# Patient Record
Sex: FEMALE | Race: BLACK OR AFRICAN AMERICAN | Employment: UNEMPLOYED | URBAN - METROPOLITAN AREA
[De-identification: names, ages, dates, MRNs, and addresses within clinical notes are randomized per-mention and may not be internally consistent; named-entity substitution may affect disease eponyms.]

---

## 2018-03-14 ENCOUNTER — OFFICE VISIT (OUTPATIENT)
Dept: NEUROLOGY | Age: 28
End: 2018-03-14

## 2018-03-14 VITALS
DIASTOLIC BLOOD PRESSURE: 80 MMHG | SYSTOLIC BLOOD PRESSURE: 118 MMHG | HEIGHT: 60 IN | OXYGEN SATURATION: 99 % | BODY MASS INDEX: 26.5 KG/M2 | HEART RATE: 80 BPM | WEIGHT: 135 LBS

## 2018-03-14 DIAGNOSIS — R56.9 SEIZURE (HCC): Primary | ICD-10-CM

## 2018-03-14 DIAGNOSIS — G80.9 CEREBRAL PALSY, UNSPECIFIED TYPE (HCC): ICD-10-CM

## 2018-03-14 RX ORDER — RISPERIDONE 3 MG/1
3 TABLET, FILM COATED ORAL 4 TIMES DAILY
COMMUNITY

## 2018-03-14 RX ORDER — DIVALPROEX SODIUM 500 MG/1
500 TABLET, DELAYED RELEASE ORAL 2 TIMES DAILY
Qty: 60 TAB | Refills: 2 | Status: SHIPPED | OUTPATIENT
Start: 2018-03-14 | End: 2018-03-14 | Stop reason: DRUGHIGH

## 2018-03-14 RX ORDER — VALPROIC ACID (AS SODIUM SALT) 100 %
500 POWDER (GRAM) MISCELLANEOUS 2 TIMES DAILY
Qty: 100 G | Refills: 2 | Status: SHIPPED | OUTPATIENT
Start: 2018-03-14 | End: 2018-03-15

## 2018-03-14 NOTE — PROGRESS NOTES
Neurology Note  REFERRED BY:  No primary care provider on file.    03/14/18    Chief Complaint   Patient presents with    New Patient     Seizure 3/12/2018       HISTORY OF PRESENT ILLNESS  Mariam Elmore is a 24 y.o. female who presented to the neurology office for seizures. The patient does have cerebral palsy and at baseline is nonverbal.  She can walk but does have problems in eating and her food has to be broken down into smaller pieces. The patient is also incontinent for bowel and bladder. The patient started having seizures at the age of 5 and has been tried on multiple medications. The patient has tried Dilantin, Topamax, phenobarbital, Lamictal, Tegretol and last was taking Depakote and was doing pretty well. Depakote was discontinued around 2010 as the patient had not had seizures for a while. The patient was brought back again because in early March 2018, the mom went to the room and saw that she was clinching and tensing up her upper extremities. She did not lose her consciousness but had gaze deviation to one side. It lasted for around 1 minute. After that she was tired and went back to sleep. The patient did have an EEG and imaging of the brain in the past but they are not available to me. Current Outpatient Prescriptions   Medication Sig    risperiDONE (RISPERDAL) 3 mg tablet Take  by mouth.  divalproex DR (DEPAKOTE) 500 mg tablet Take 1 Tab by mouth two (2) times a day. No current facility-administered medications for this visit. Not on File  History reviewed. No pertinent past medical history. History reviewed. No pertinent surgical history. History reviewed. No pertinent family history.   Social History   Substance Use Topics    Smoking status: Never Smoker    Smokeless tobacco: Never Used    Alcohol use None       REVIEW OF SYSTEMS:   A ten system review of constitutional, cardiovascular, respiratory, musculoskeletal, endocrine, skin, SHEENT, genitourinary, psychiatric and neurologic systems was obtained and is unremarkable with the exception of seizures     EXAMINATION:   Visit Vitals    /80    Pulse 80    Ht 5' (1.524 m)    Wt 135 lb (61.2 kg)    SpO2 99%    BMI 26.37 kg/m2        General:   General appearance: Pt is in no acute distress   Distal pulses are preserved  Fundoscopic Exam: Attempted    Neurological Examination:Limited as the patient is nonverbal and does not understand commands. Patient has cerebral palsy  Mental Status: Patient is alert and awake but does not follow commands     Cranial Nerves: Pupils are equal and reactive to light. Extraocular movements are intact and face is symmetric. Motor: Strength is 5/5 in all 4 ext. No atrophy. Tone: Normal    Sensation: Unable to assess    Reflexes: DTRs 2+ throughout. Coordination/Cerebellar: Unable to assess    Gait: Patient is in a wheelchair    Skin: No significant bruising or lacerations. Laboratory review:   No results found for this or any previous visit. Imaging review:  None    Documentation review:  None    Assessment/Plan:   Mu Marcus is a 24 y.o. female who presented to the neurology office for management of seizures. Patient does have cerebral palsy and has history of seizures but was discontinued on Depakote around 8-9 years ago. The patient has been having intermittent staring spells and did have a seizure 3 days ago. We will restart the patient on Depakote 500 mg p.o. twice daily and will get records from her previous neurologist.    Follow-up in 6 weeks and at that time will get Depakote level.     Over 60 minutes was spent with the patient and family of which > 50% of the visit was spent counseling on diagnosis, management, and treatment of the diagnosis        PHQ over the last two weeks 3/14/2018   Little interest or pleasure in doing things Not at all   Feeling down, depressed or hopeless Not at all   Total Score PHQ 2 0     Primary care to address possible depression if PHQ-9 score is more than 9. ICD-10-CM ICD-9-CM    1. Seizure ([facility] R56.9 780.39 divalproex DR (DEPAKOTE) 500 mg tablet   2. Cerebral palsy, unspecified type ([facility] G80.9 343.9       Thank you for allowing me to participate in the care of Ms. [facility]. Please feel free to contact me if you have any questions. Lizbeth Jimenez MD  Neurologist    CC: No primary care provider on file. Fax: None    This note was created using voice recognition software. Despite editing, there may be syntax errors.

## 2018-03-14 NOTE — MR AVS SNAPSHOT
[address], 
[address], [address] [place] [phone] Patient: Stacey Sevilla MRN: [id number] :1990 Visit Information Date & Time Provider Department Dept. Phone Encounter #  
 3/14/2018  3:00 PM Lily Egan MD Neurology [facility] at [facility] [phone] [id number] Upcoming Health Maintenance Date Due  
 HPV AGE 9Y-34Y (1 of 3 - Female 3 Dose Series) 2003 DTaP/Tdap/Td series (1 - Tdap) 2013 PAP AKA CERVICAL CYTOLOGY 2013 Influenza Age 5 to Adult 2017 Allergies as of 3/14/2018  Review Complete On: 3/14/2018 By: Lily Egan MD  
 Not on File Current Immunizations  Never Reviewed No immunizations on file. Not reviewed this visit You Were Diagnosed With   
  
 Codes Comments Seizure ([facility]    -  Primary ICD-10-CM: R56.9 ICD-9-CM: 780.39 Cerebral palsy, unspecified type ([facility]     ICD-10-CM: G80.9 ICD-9-CM: 689. 9 Vitals BP Pulse Height(growth percentile) Weight(growth percentile) SpO2 BMI  
 118/80 80 5' (1.524 m) 135 lb (61.2 kg) 99% 26.37 kg/m2 Smoking Status Never Smoker Vitals History BMI and BSA Data Body Mass Index Body Surface Area  
 26.37 kg/m 2 1.61 m 2 Preferred Pharmacy Pharmacy Name Phone [id number]  [facility] [address] [place] [address] [phone] Your Updated Medication List  
  
   
This list is accurate as of 3/14/18  3:33 PM.  Always use your most recent med list.  
  
  
  
  
 divalproex  mg tablet Commonly known as:  DEPAKOTE Take 1 Tab by mouth two (2) times a day. risperiDONE 3 mg tablet Commonly known as:  RisperDAL Take  by mouth. Prescriptions Sent to Pharmacy Refills  
 divalproex DR (DEPAKOTE) 500 mg tablet 2 Sig: Take 1 Tab by mouth two (2) times a day.   
 Class: Normal  
 Pharmacy: [facility] [address] [place] [address], [address] [facility] #: [phone] Route: Oral  
  
Patient Instructions 1. Start Depakote 500 mg p.o. twice daily 2. Follow-up in 6 weeks 3. Get records from the previous neurologist 
  
A Healthy Lifestyle: Care Instructions Your Care Instructions A healthy lifestyle can help you feel good, stay at a healthy weight, and have plenty of energy for both work and play. A healthy lifestyle is something you can share with your whole family. A healthy lifestyle also can lower your risk for serious health problems, such as high blood pressure, heart disease, and diabetes. You can follow a few steps listed below to improve your health and the health of your family. Follow-up care is a key part of your treatment and safety. Be sure to make and go to all appointments, and call your doctor if you are having problems. It's also a good idea to know your test results and keep a list of the medicines you take. How can you care for yourself at home? · Do not eat too much sugar, fat, or fast foods. You can still have dessert and treats now and then. The goal is moderation. · Start small to improve your eating habits. Pay attention to portion sizes, drink less juice and soda pop, and eat more fruits and vegetables. ¨ Eat a healthy amount of food. A 3-ounce serving of meat, for example, is about the size of a deck of cards. Fill the rest of your plate with vegetables and whole grains. ¨ Limit the amount of soda and sports drinks you have every day. Drink more water when you are thirsty. ¨ Eat at least 5 servings of fruits and vegetables every day. It may seem like a lot, but it is not hard to reach this goal. A serving or helping is 1 piece of fruit, 1 cup of vegetables, or 2 cups of leafy, raw vegetables. Have an apple or some carrot sticks as an afternoon snack instead of a candy bar.  Try to have fruits and/or vegetables at every meal. 
 · Make exercise part of your daily routine. You may want to start with simple activities, such as walking, bicycling, or slow swimming. Try to be active 30 to 60 minutes every day. You do not need to do all 30 to 60 minutes all at once. For example, you can exercise 3 times a day for 10 or 20 minutes. Moderate exercise is safe for most people, but it is always a good idea to talk to your doctor before starting an exercise program. 
· Keep moving. [place] the lawn, work in the garden, or [employer]. Take the stairs instead of the elevator at work. · If you smoke, quit. People who smoke have an increased risk for heart attack, stroke, cancer, and other lung illnesses. Quitting is hard, but there are ways to boost your chance of quitting tobacco for good. ¨ Use nicotine gum, patches, or lozenges. ¨ Ask your doctor about stop-smoking programs and medicines. ¨ Keep trying. In addition to reducing your risk of diseases in the future, you will notice some benefits soon after you stop using tobacco. If you have shortness of breath or asthma symptoms, they will likely get better within a few weeks after you quit. · Limit how much alcohol you drink. Moderate amounts of alcohol (up to 2 drinks a day for men, 1 drink a day for women) are okay. But drinking too much can lead to liver problems, high blood pressure, and other health problems. Family health If you have a family, there are many things you can do together to improve your health. · Eat meals together as a family as often as possible. · Eat healthy foods. This includes fruits, vegetables, lean meats and dairy, and whole grains. · Include your family in your fitness plan. Most people think of activities such as jogging or tennis as the way to fitness, but there are many ways you and your family can be more active. Anything that makes you breathe hard and gets your heart pumping is exercise. Here are some tips: ¨ Walk to do errands or to take your child to school or the bus. ¨ Go for a family bike ride after dinner instead of watching TV. Where can you learn more? Go to http://adithya-florina.info/. Enter V646 in the search box to learn more about \"A Healthy Lifestyle: Care Instructions. \" Current as of: May 12, 2017 Content Version: 11.4 © 4216-2752 [employer]. Care instructions adapted under license by [employer] (which disclaims liability or warranty for this information). If you have questions about a medical condition or this instruction, always ask your healthcare professional. [address] any warranty or liability for your use of this information. Please be advised there is a $25 fee for all paperwork to be completed from our  providers. This is to be paid by the patient prior to picking up the completed forms. Introducing [facility]! Florence Bergeron introduces Comenta.[employer] (Wayin) patient portal. Now you can access parts of your medical record, email your doctor's office, and request medication refills online. 1. In your internet browser, go to [email or web address] 2. Click on the First Time User? Click Here link in the Sign In box. You will see the New Member Sign Up page. 3. Enter your [employer] (Wayin) Access Code exactly as it appears below. You will not need to use this code after youve completed the sign-up process. If you do not sign up before the expiration date, you must request a new code. · [employer] (Wayin) Access Code: [email or web address] Expires: 6/12/2018  2:53 PM 
 
4. Enter the last four digits of your Social Security Number (xxxx) and Date of Birth (mm/dd/yyyy) as indicated and click Submit. You will be taken to the next sign-up page. 5. Create a Comenta.TV (Wayin) ID. This will be your Com[employer]TV (Wayin) login ID and cannot be changed, so think of one that is secure and easy to remember. 6. Create a medidametrics password. You can change your password at any time. 7. Enter your Password Reset Question and Answer. This can be used at a later time if you forget your password. 8. Enter your e-mail address. You will receive e-mail notification when new information is available in [address] 9. Click Sign Up. You can now view and download portions of your medical record. 10. Click the Download Summary menu link to download a portable copy of your medical information. If you have questions, please visit the Frequently Asked Questions section of the medidametrics website. Remember, medidametrics is NOT to be used for urgent needs. For medical emergencies, dial 911. Now available from your iPhone and Android! Please provide this summary of care documentation to your next provider. If you have any questions after today's visit, please call [phone].

## 2018-03-14 NOTE — PATIENT INSTRUCTIONS
1.  Start Depakote 500 mg p.o. twice daily  2. Follow-up in 6 weeks  3. Get records from the previous neurologist     A Healthy Lifestyle: Care Instructions  Your Care Instructions    A healthy lifestyle can help you feel good, stay at a healthy weight, and have plenty of energy for both work and play. A healthy lifestyle is something you can share with your whole family. A healthy lifestyle also can lower your risk for serious health problems, such as high blood pressure, heart disease, and diabetes. You can follow a few steps listed below to improve your health and the health of your family. Follow-up care is a key part of your treatment and safety. Be sure to make and go to all appointments, and call your doctor if you are having problems. It's also a good idea to know your test results and keep a list of the medicines you take. How can you care for yourself at home? · Do not eat too much sugar, fat, or fast foods. You can still have dessert and treats now and then. The goal is moderation. · Start small to improve your eating habits. Pay attention to portion sizes, drink less juice and soda pop, and eat more fruits and vegetables. ¨ Eat a healthy amount of food. A 3-ounce serving of meat, for example, is about the size of a deck of cards. Fill the rest of your plate with vegetables and whole grains. ¨ Limit the amount of soda and sports drinks you have every day. Drink more water when you are thirsty. ¨ Eat at least 5 servings of fruits and vegetables every day. It may seem like a lot, but it is not hard to reach this goal. A serving or helping is 1 piece of fruit, 1 cup of vegetables, or 2 cups of leafy, raw vegetables. Have an apple or some carrot sticks as an afternoon snack instead of a candy bar. Try to have fruits and/or vegetables at every meal.  · Make exercise part of your daily routine. You may want to start with simple activities, such as walking, bicycling, or slow swimming.  Try to be active 30 to 60 minutes every day. You do not need to do all 30 to 60 minutes all at once. For example, you can exercise 3 times a day for 10 or 20 minutes. Moderate exercise is safe for most people, but it is always a good idea to talk to your doctor before starting an exercise program.  · Keep moving. Jaciel Gomez the lawn, work in the garden, or [employer]. Take the stairs instead of the elevator at work. · If you smoke, quit. People who smoke have an increased risk for heart attack, stroke, cancer, and other lung illnesses. Quitting is hard, but there are ways to boost your chance of quitting tobacco for good. ¨ Use nicotine gum, patches, or lozenges. ¨ Ask your doctor about stop-smoking programs and medicines. ¨ Keep trying. In addition to reducing your risk of diseases in the future, you will notice some benefits soon after you stop using tobacco. If you have shortness of breath or asthma symptoms, they will likely get better within a few weeks after you quit. · Limit how much alcohol you drink. Moderate amounts of alcohol (up to 2 drinks a day for men, 1 drink a day for women) are okay. But drinking too much can lead to liver problems, high blood pressure, and other health problems. Family health  If you have a family, there are many things you can do together to improve your health. · Eat meals together as a family as often as possible. · Eat healthy foods. This includes fruits, vegetables, lean meats and dairy, and whole grains. · Include your family in your fitness plan. Most people think of activities such as jogging or tennis as the way to fitness, but there are many ways you and your family can be more active. Anything that makes you breathe hard and gets your heart pumping is exercise. Here are some tips:  ¨ Walk to do errands or to take your child to school or the bus. ¨ Go for a family bike ride after dinner instead of watching TV. Where can you learn more?   Go to http://adithya-florina.info/. Enter A699 in the search box to learn more about \"A Healthy Lifestyle: Care Instructions. \"  Current as of: May 12, 2017  Content Version: 11.4  © 0877-5286 Healthwise, [employer]. Care instructions adapted under license by [employer] (which disclaims liability or warranty for this information). If you have questions about a medical condition or this instruction, always ask your healthcare professional. [place] [address] any warranty or liability for your use of this information. Please be advised there is a $25 fee for all paperwork to be completed from our  providers. This is to be paid by the patient prior to picking up the completed forms.

## 2018-03-15 RX ORDER — DIVALPROEX SODIUM 125 MG/1
CAPSULE, COATED PELLETS ORAL
Qty: 240 CAP | Refills: 2 | Status: SHIPPED | OUTPATIENT
Start: 2018-03-15 | End: 2020-01-28

## 2018-03-15 NOTE — TELEPHONE ENCOUNTER
Called [facility] verbal order given for Depakote. Dr. Reshma Schulz please review prescription and sign if okay.

## 2018-11-19 ENCOUNTER — OFFICE VISIT (OUTPATIENT)
Dept: NEUROLOGY | Age: 28
End: 2018-11-19

## 2018-11-19 VITALS — SYSTOLIC BLOOD PRESSURE: 110 MMHG | HEIGHT: 60 IN | DIASTOLIC BLOOD PRESSURE: 62 MMHG | BODY MASS INDEX: 26.37 KG/M2

## 2018-11-19 DIAGNOSIS — G80.9 CEREBRAL PALSY, UNSPECIFIED TYPE (HCC): ICD-10-CM

## 2018-11-19 DIAGNOSIS — R56.9 SEIZURE (HCC): Primary | ICD-10-CM

## 2018-11-19 NOTE — PROGRESS NOTES
Neurology Note    Chief Complaint   Patient presents with    Follow-up     seizures       SUBJECTIVE:    Mina Vidal is a 24 y.o. female who presented to the neurology office for seizures. The patient does have cerebral palsy and at baseline is nonverbal.  She can walk but does have problems in eating and her food has to be broken down into smaller pieces. The patient is also incontinent for bowel and bladder. The patient started having seizures at the age of 5 and has been tried on multiple medications. The patient has tried Dilantin, Topamax, phenobarbital, Lamictal, Tegretol and last was taking Depakote and was doing pretty well. Depakote was discontinued around 2010 as the patient had not had seizures for a while. The patient was brought back again because in early March 2018, the mom went to the room and saw that she was clinching and tensing up her upper extremities. She did not lose her consciousness but had gaze deviation to one side. It lasted for around 1 minute. After that she was tired and went back to sleep. The patient did have an EEG and imaging of the brain in the past but they are not available to me. Interval history:  The patient is doing Depakote 500 mg p.o. twice daily and has not had any more staring spells. Current Outpatient Medications   Medication Sig    divalproex (DEPAKOTE SPRINKLES) 125 mg capsule Take four capsules 2 times a day    risperiDONE (RISPERDAL) 3 mg tablet Take  by mouth. No current facility-administered medications for this visit. No Known Allergies  Past Medical History:   Diagnosis Date    Cerebral pals[facility] ([facility]     Developmental non-verbal disorder     Mental developmental delay     Seizure disorder ([facility]     Last Seizure 2007     Past Surgical History:   Procedure Laterality Date    HX HEENT  13 yrs old    Tonsillectomy and Adenoidectomy    HX ORTHOPAEDIC  2002    Feet w/ Skin Grafts     No family history on file.   Social History Tobacco Use    Smoking status: Never Smoker    Smokeless tobacco: Never Used   Substance Use Topics    Alcohol use: Not on file    Drug use: Not on file       REVIEW OF SYSTEMS:   A ten system review of constitutional, cardiovascular, respiratory, musculoskeletal, endocrine, skin, SHEENT, genitourinary, psychiatric and neurologic systems was obtained and is unremarkable with the exception of seizures      EXAMINATION:   Visit Vitals  /62   Ht 5' (1.524 m)   BMI 26.37 kg/m²        General:   General appearance: Pt is in no acute distress   Distal pulses are preserved  Fundoscopic Exam: Attempted    Neurological Examination:-Limited as the patient is nonverbal and does not understand commands. Patient has cerebral palsy  Mental Status: Patient is alert and awake but does not follow commands     Cranial Nerves: Pupils are equal and reactive to light. Extraocular movements are intact and face is symmetric. Motor: Strength is 5/5 in all 4 ext. No atrophy. Tone: Normal    Sensation: Unable to assess    Reflexes: DTRs 2+ throughout. Coordination/Cerebellar: Unable to assess    Gait: Patient is in a wheelchair    Skin: No significant bruising or lacerations. Laboratory review:   Results for orders placed or performed in visit on 09/23/09   CBC W/ AUTOMATED DIFF   Result Value Ref Range    WBC 11.7 (H) 3.6 - 11.0 K/uL    RBC 4.16 3.80 - 5.20 M/uL    HGB 13.3 11.5 - 16.0 g/dL    HCT 38.5 35.0 - 47.0 %    MCV 92.5 80.0 - 99.0 FL    MCH 32.0 26.0 - 34.0 PG    MCHC 34.5 30.0 - 35.0 g/dL    RDW 13.2 11.5 - 14.5 %    PLATELET 706 924 - 050 K/uL    NEUTROPHILS 63 32 - 75 %    LYMPHOCYTES 24 12 - 49 %    MONOCYTES 12 5 - 13 %    EOSINOPHILS 1 0 - 7 %    BASOPHILS 0 0 - 1 %    ABS. NEUTROPHILS 7.4 1.8 - 8.0 K/UL    ABS. LYMPHOCYTES 2.8 0.8 - 3.5 K/UL    ABS. MONOCYTES 1.4 (H) 0 - 1.0 K/UL    ABS. EOSINOPHILS 0.1 0.0 - 0.4 K/UL    ABS.  BASOPHILS 0.0 0.0 - 0.1 K/UL   METABOLIC PANEL, COMPREHENSIVE Result Value Ref Range    Sodium 140 132 - 141 MMOL/L    Potassium 4.5 3.5 - 5.1 MMOL/L    Chloride 105 97 - 108 MMOL/L    CO2 29 18 - 29 MMOL/L    Anion gap 6 5 - 15 mmol/L    Glucose 77 54 - 117 MG/DL    BUN 8 6 - 20 MG/DL    Creatinine 0.7 0.3 - 1.1 MG/DL    BUN/Creatinine ratio 11 (L) 12 - 20      GFR est AA >60 >60 ml/min/1.73m2    GFR est non-AA >60 >60 ml/min/1.73m2    Calcium 9.7 8.5 - 10.1 MG/DL    Bilirubin, total 0.2 <1.0 MG/DL    ALT (SGPT) 32 30 - 65 U/L    AST (SGOT) 12 (L) 15 - 37 U/L    Alk. phosphatase 108 40 - 120 U/L    Protein, total 7.1 6.4 - 8.4 g/dL    Albumin 3.7 3.5 - 5.0 g/dL    Globulin 3.4 2.0 - 4.0 g/dL    A-G Ratio 1.1 1.1 - 2.2     VALPROIC ACID   Result Value Ref Range    Valproic acid 107 (H) 50 - 100 ug/ml       Imaging review:  None    Documentation review:  None    Assessment/Plan:   Nazia Archuleta is a 24 y.o. female who presented to the neurology office for management of seizures. Patient does have cerebral palsy and has history of seizures but was discontinued on Depakote around 8-9 years ago. The patient had been having intermittent staring spells and now is on Depakote 500 mg p.o. twice daily and has not had any more staring spells. We will get Depakote level today. Follow-up in 6 months. Over 25 minutes was spent with the patient and family of which > 50% of the visit was spent counseling on diagnosis, management, and treatment of the diagnosis        PHQ over the last two weeks 3/14/2018   Little interest or pleasure in doing things Not at all   Feeling down, depressed, irritable, or hopeless Not at all   Total Score PHQ 2 0     Primary care to address possible depression if PHQ-9 score is more than 9. ICD-10-CM ICD-9-CM    1. Seizure ([facility] R56.9 780.39 CBC WITH AUTOMATED DIFF      METABOLIC PANEL, COMPREHENSIVE      VALPROIC ACID   2.  Cerebral palsy, unspecified type ([facility] G80.9 343.9       Thank you for allowing me to participate in the care of Ms. [facility]. Please feel free to contact me if you have any questions. Gunner Barone MD  Neurologist    CC: Other, MD Sade  Fax: None    This note was created using voice recognition software. Despite editing, there may be syntax errors.

## 2018-11-19 NOTE — PATIENT INSTRUCTIONS
1.  Blood work today  2. Follow-up in 6 months  Office Policies  · Phone calls/patient messages:  Please allow up to 24 hours for someone in the office to contact you about your call or message. Be mindful your provider may be out of the office or your message may require further review. We encourage you to use [employer] for your messages as this is a faster, more efficient way to communicate with our office  · Medication Refills:  Prescription medications require up to 48 business hours to process. We encourage you to use [employer] for your refills. For controlled medications: Please allow up to 72 business hours to process. Certain medications may require you to  a written prescription at our office. NO narcotic/controlled medications will be prescribed after 4pm Monday through Friday or on weekends  · Form/Paperwork Completion:  Please note there is a $25 fee for all paperwork completed by our providers. We ask that you allow 7-14 business days. Pre-payment is due prior to picking up/faxing the completed form. You may also download your forms to [employer] to have your doctor print off.

## 2020-07-27 ENCOUNTER — VIRTUAL VISIT (OUTPATIENT)
Dept: FAMILY MEDICINE CLINIC | Age: 30
End: 2020-07-27

## 2020-07-27 DIAGNOSIS — Z13.220 SCREENING FOR LIPID DISORDERS: ICD-10-CM

## 2020-07-27 DIAGNOSIS — Z13.1 SCREENING FOR DIABETES MELLITUS: ICD-10-CM

## 2020-07-27 DIAGNOSIS — G80.9 CEREBRAL PALSY, UNSPECIFIED TYPE (HCC): ICD-10-CM

## 2020-07-27 DIAGNOSIS — Z76.89 ENCOUNTER TO ESTABLISH CARE WITH NEW DOCTOR: Primary | ICD-10-CM

## 2020-07-27 RX ORDER — DIVALPROEX SODIUM 125 MG/1
TABLET, DELAYED RELEASE ORAL
COMMUNITY
Start: 2020-07-07 | End: 2020-07-27

## 2020-07-27 RX ORDER — RISPERIDONE 3 MG/1
TABLET, ORALLY DISINTEGRATING ORAL
COMMUNITY
Start: 2020-07-15 | End: 2020-07-27

## 2020-07-27 RX ORDER — RISPERIDONE 1 MG/1
TABLET, FILM COATED ORAL
COMMUNITY
Start: 2020-07-06 | End: 2020-07-27

## 2020-07-27 NOTE — PROGRESS NOTES
Dick Pal, who was evaluated through a synchronous (real-time) audio-video encounter, and/or her healthcare decision maker, is aware that it is a billable service, with coverage as determined by her insurance carrier. She provided verbal consent to proceed: YES, and patient identification was verified. It was conducted pursuant to the emergency declaration under the [facility], [facility] authority and the [facility] and [employer] General Act. A caregiver was present when appropriate. Ability to conduct physical exam was limited. I was in the office. The patient was at home. This virtual visit was conducted via [employer]. Pursuant to the emergency declaration under the [facility], [address] [place] [address] authority and the "[employer] and [employer] Act, this Virtual  Visit was conducted to reduce the patient's risk of exposure to COVID-19 and provide continuity of care for an established patient. Services were provided through a video synchronous discussion virtually to substitute for in-person clinic visit. Due to this being a TeleHealth evaluation, many elements of the physical examination are unable to be assessed. Total Time: minutes: 11-20 minutes. Austin Giles MD    711  Subjective:   Dick Pal was seen for New Patient    Pt with hx of cerebral palsy. Mother is speaking on her behalf. Main concern is that she used to be able to walk without difficulty up until 3 years ago. Now she scoots around the floor and requires a wheelchair for transfer. Has seen orthopedics and done PT but this has not been helpful. Sees a neurologist for seizures but has not spoken to neurology regarding her gait issues. She is seen by psychiatry as well. No prior pap smear; has never been sexually active.   Used to be seen by [facility]; mother believes she is UTD on vaccines. Prior to Admission medications    Medication Sig Start Date End Date Taking? Authorizing Provider   divalproex (DEPAKOTE SPRINKLE) 125 mg capsule TAKE 4 CAPSULES BY MOUTH TWICE DAILY. Setup fu appt for further refills. Patient taking differently: TAKE 4 CAPSULES BY MOUTH TWICE DAILY. 3/20/20  Yes Jaylene Aguilar MD   risperiDONE (RISPERDAL) 3 mg tablet Take 3 mg by mouth four (4) times daily.    Yes Provider, Historical   divalproex DR (DEPAKOTE) 125 mg tablet  7/7/20 7/27/20  Provider, Historical   risperiDONE (RisperDAL) 1 mg tablet  7/6/20 7/27/20  Provider, Historical   risperiDONE (RisperDAL m-tabs) 3 mg TbDi  7/15/20 7/27/20  Provider, Historical       No Known Allergies    Review of Systems   Unable to perform ROS: Patient nonverbal         Past Medical History:   Diagnosis Date    Cerebral palsy ([facility] [address]     History of seizures     Mental developmental delay        Past Surgical History:   Procedure Laterality Date    HX HEENT  13 yrs old    Tonsillectomy and Adenoidectomy    HX ORTHOPAEDIC  2002    Feet w/ Skin Grafts       Family History   Problem Relation Age of Onset    No Known Problems Mother     No Known Problems Father        Social History     Socioeconomic History    Marital status: SINGLE     Spouse name: Not on file    Number of children: Not on file    Years of education: Not on file    Highest education level: Not on file   Occupational History    Not on file   Social Needs    Financial resource strain: Not on file    Food insecurity     Worry: Not on file     Inability: Not on file    Transportation needs     Medical: Not on file     Non-medical: Not on file   Tobacco Use    Smoking status: Never Smoker    Smokeless tobacco: Never Used   Substance and Sexual Activity    Alcohol use: Not on file    Drug use: Not on file    Sexual activity: Not on file   Lifestyle    Physical activity     Days per week: Not on file     Minutes per session: Not on file    Stress: Not on file   Relationships    Social connections     Talks on phone: Not on file     Gets together: Not on file     Attends Orthodoxy service: Not on file     Active member of club or organization: Not on file     Attends meetings of clubs or organizations: Not on file     Relationship status: Not on file    Intimate partner violence     Fear of current or ex partner: Not on file     Emotionally abused: Not on file     Physically abused: Not on file     Forced sexual activity: Not on file   Other Topics Concern    Not on file   Social History Narrative    ** Merged History Encounter **              Physical Exam:     There were no vitals taken for this visit. General: alert, cooperative, no distress   Mental  status: normal mood, behavior, speech, dress, motor activity, and thought processes, able to follow commands   HENT: NCAT   Neck: no visualized mass   Resp: no respiratory distress   Neuro: no gross deficits   Skin: no discoloration or lesions of concern on visible areas   Psychiatric: normal affect, consistent with stated mood, no evidence of hallucinations       Assessment & Plan:     Dash Dominique is a 34 y.o. female who presents today for:    1. Encounter to establish care with new doctor  Reviewed age appropriate screening tests as recommended by the [employer] Preventive Services Database with patient today. Reviewed pros/cons of pap smear; will defer to parents on whether this is something they want to pursue (Would likely need anesthesia with OBGYN). 2. Cerebral palsy, unspecified type ([address]  Concerning that her gait has declined over the recent years. Advised to discuss with neurology first to rule out neurological issues; consider PM&R if thought be due to her CP. 3. Screening for diabetes mellitus  - HEMOGLOBIN A1C WITH EAG    4.  Screening for lipid disorders  - CBC W/O DIFF  - METABOLIC PANEL, COMPREHENSIVE  - LIPID PANEL       Medications Discontinued During This Encounter   Medication Reason    divalproex DR (DEPAKOTE) 125 mg tablet Not A Current Medication    risperiDONE (RisperDAL) 1 mg tablet Not A Current Medication    risperiDONE (RisperDAL m-tabs) 3 mg TbDi Not A Current Medication       Follow-up and Dispositions    · Return in about 1 year (around 7/27/2021) for CPE (30 min). Treatment risks/benefits/costs/interactions/alternatives discussed with patient. Advised patient to call back or return to office if symptoms worsen/change/persist. If patient cannot reach us or should anything more severe/urgent arise he/she should proceed directly to the nearest emergency department. Discussed expected course/resolution/complications of diagnosis in detail with patient. Patient expressed understanding with the diagnosis and plan. Liat Good M.D.

## 2020-07-27 NOTE — PROGRESS NOTES
Chief Complaint   Patient presents with    New Patient     1. Have you been to the ER, urgent care clinic since your last visit? Hospitalized since your last visit? No    2. Have you seen or consulted any other health care providers outside of the [address] [place] [address] since your last visit? Include any pap smears or colon screening. No     Patient present via virtual visit with mom today.

## 2020-08-31 ENCOUNTER — VIRTUAL VISIT (OUTPATIENT)
Dept: NEUROLOGY | Age: 30
End: 2020-08-31
Payer: COMMERCIAL

## 2020-08-31 DIAGNOSIS — R56.9 SEIZURE (HCC): Primary | ICD-10-CM

## 2020-08-31 DIAGNOSIS — G80.9 CEREBRAL PALSY, UNSPECIFIED TYPE (HCC): ICD-10-CM

## 2020-08-31 PROCEDURE — 99443 PR PHYS/QHP TELEPHONE EVALUATION 21-30 MIN: CPT | Performed by: PSYCHIATRY & NEUROLOGY

## 2020-08-31 RX ORDER — DIVALPROEX SODIUM 125 MG/1
CAPSULE, COATED PELLETS ORAL
Qty: 720 CAP | Refills: 1 | Status: SHIPPED | OUTPATIENT
Start: 2020-08-31 | End: 2021-04-16

## 2020-08-31 NOTE — PROGRESS NOTES
Neurology Note    Chief Complaint   Patient presents with    Follow-up     patient has not been seen 1 1/2 year    Seizure       SUBJECTIVE:    Chapito Aceves is a 34 y.o. female who presented to the neurology office for seizures. The patient does have cerebral palsy and at baseline is nonverbal.  She can walk but does have problems in eating and her food has to be broken down into smaller pieces. The patient is also incontinent for bowel and bladder. The patient started having seizures at the age of 5 and has been tried on multiple medications. The patient has tried Dilantin, Topamax, phenobarbital, Lamictal, Tegretol and last was taking Depakote and was doing pretty well. Depakote was discontinued around 2010 as the patient had not had seizures for a while. The patient was brought back again because in early March 2018, the mom went to the room and saw that she was clinching and tensing up her upper extremities. She did not lose her consciousness but had gaze deviation to one side. It lasted for around 1 minute. After that she was tired and went back to sleep. The patient did have an EEG and imaging of the brain in the past but they are not available to me. Interval history:  The patient is doing Depakote 500 mg p.o. twice daily and has not had any more staring spells. Current Outpatient Medications   Medication Sig    divalproex (DEPAKOTE SPRINKLE) 125 mg capsule TAKE 4 CAPSULES BY MOUTH TWICE DAILY. Setup fu appt for further refills. (Patient taking differently: TAKE 4 CAPSULES BY MOUTH TWICE DAILY. )    risperiDONE (RISPERDAL) 3 mg tablet Take 3 mg by mouth four (4) times daily. No current facility-administered medications for this visit.       No Known Allergies  Past Medical History:   Diagnosis Date    Cerebral palsy (HCC)     History of seizures     Mental developmental delay      Past Surgical History:   Procedure Laterality Date    HX HEENT  13 yrs old    Tonsillectomy and Adenoidectomy    HX ORTHOPAEDIC  2002    Feet w/ Skin Grafts     Family History   Problem Relation Age of Onset    No Known Problems Mother     No Known Problems Father      Social History     Tobacco Use    Smoking status: Never Smoker    Smokeless tobacco: Never Used   Substance Use Topics    Alcohol use: Not on file    Drug use: Not on file       REVIEW OF SYSTEMS:   A ten system review of constitutional, cardiovascular, respiratory, musculoskeletal, endocrine, skin, SHEENT, genitourinary, psychiatric and neurologic systems was obtained and is unremarkable with the exception of seizures      EXAMINATION:   There were no vitals taken for this visit. Telephone encounter    Laboratory review:   Results for orders placed or performed in visit on 09/23/09   CBC W/ AUTOMATED DIFF   Result Value Ref Range    WBC 11.7 (H) 3.6 - 11.0 K/uL    RBC 4.16 3.80 - 5.20 M/uL    HGB 13.3 11.5 - 16.0 g/dL    HCT 38.5 35.0 - 47.0 %    MCV 92.5 80.0 - 99.0 FL    MCH 32.0 26.0 - 34.0 PG    MCHC 34.5 30.0 - 35.0 g/dL    RDW 13.2 11.5 - 14.5 %    PLATELET 677 640 - 605 K/uL    NEUTROPHILS 63 32 - 75 %    LYMPHOCYTES 24 12 - 49 %    MONOCYTES 12 5 - 13 %    EOSINOPHILS 1 0 - 7 %    BASOPHILS 0 0 - 1 %    ABS. NEUTROPHILS 7.4 1.8 - 8.0 K/UL    ABS. LYMPHOCYTES 2.8 0.8 - 3.5 K/UL    ABS. MONOCYTES 1.4 (H) 0 - 1.0 K/UL    ABS. EOSINOPHILS 0.1 0.0 - 0.4 K/UL    ABS.  BASOPHILS 0.0 0.0 - 0.1 K/UL   METABOLIC PANEL, COMPREHENSIVE   Result Value Ref Range    Sodium 140 132 - 141 MMOL/L    Potassium 4.5 3.5 - 5.1 MMOL/L    Chloride 105 97 - 108 MMOL/L    CO2 29 18 - 29 MMOL/L    Anion gap 6 5 - 15 mmol/L    Glucose 77 54 - 117 MG/DL    BUN 8 6 - 20 MG/DL    Creatinine 0.7 0.3 - 1.1 MG/DL    BUN/Creatinine ratio 11 (L) 12 - 20      GFR est AA >60 >60 ml/min/1.73m2    GFR est non-AA >60 >60 ml/min/1.73m2    Calcium 9.7 8.5 - 10.1 MG/DL    Bilirubin, total 0.2 <1.0 MG/DL    ALT (SGPT) 32 30 - 65 U/L    AST (SGOT) 12 (L) 15 - 37 U/L Alk. phosphatase 108 40 - 120 U/L    Protein, total 7.1 6.4 - 8.4 g/dL    Albumin 3.7 3.5 - 5.0 g/dL    Globulin 3.4 2.0 - 4.0 g/dL    A-G Ratio 1.1 1.1 - 2.2     VALPROIC ACID   Result Value Ref Range    Valproic acid 107 (H) 50 - 100 ug/ml       Imaging review:  None    Documentation review:  None    Assessment/Plan:   Krystal Masterson is a 34 y.o. female who presented to the neurology office for management of seizures. Patient does have cerebral palsy and has history of seizures. She is on Depakote 500 mg p.o. twice daily and is not having any more seizures or staring spells. Family wants to get Depakote level and will order that. We will also order CBC and CMP. Follow-up in 6 months        3 most recent PHQ Screens 7/27/2020   Little interest or pleasure in doing things Not at all   Feeling down, depressed, irritable, or hopeless Not at all   Total Score PHQ 2 0     Primary care to address possible depression if PHQ-9 score is more than 9. ICD-10-CM ICD-9-CM    1. Seizure ([facility] [address]  R56.9 780.39    2. Cerebral palsy, unspecified type ([address]  G80.9 343.9       Thank you for allowing me to participate in the care of Ms. Yuliet0 [facility]. Please feel free to contact me if you have any questions. Norma He MD  Neurologist    CC: Aries Morales MD  Fax: [phone]    This note was created using voice recognition software. Despite editing, there may be syntax errors. Krystal Masterson is a 34 y.o. female evaluated via telephone on 8/31/2020. Consent:  She and/or health care decision maker is aware that that she may receive a bill for this telephone service, depending on her insurance coverage, and has provided verbal consent to proceed: Yes    Documentation:  I communicated with the patient and/or health care decision maker about his presenting symptoms.    Details of this discussion including any medical advice provided: See notes      I affirm this is a Patient Initiated Episode with an Established Patient who has not had a related appointment within my department in the past 7 days or scheduled within the next 24 hours.     Total Time: minutes: 21-30 minutes    Note: not billable if this call serves to triage the patient into an appointment for the relevant concern

## 2021-03-06 LAB
ALBUMIN SERPL-MCNC: 4.4 G/DL (ref 3.9–5)
ALBUMIN/GLOB SERPL: 1.3 {RATIO} (ref 1.2–2.2)
ALP SERPL-CCNC: 114 IU/L (ref 39–117)
ALT SERPL-CCNC: 9 IU/L (ref 0–32)
AST SERPL-CCNC: 15 IU/L (ref 0–40)
BASOPHILS # BLD AUTO: 0.2 X10E3/UL (ref 0–0.2)
BASOPHILS NFR BLD AUTO: 2 %
BILIRUB SERPL-MCNC: 0.2 MG/DL (ref 0–1.2)
BUN SERPL-MCNC: 14 MG/DL (ref 6–20)
BUN/CREAT SERPL: 16 (ref 9–23)
CALCIUM SERPL-MCNC: 10.3 MG/DL (ref 8.7–10.2)
CHLORIDE SERPL-SCNC: 110 MMOL/L (ref 96–106)
CO2 SERPL-SCNC: 16 MMOL/L (ref 20–29)
CREAT SERPL-MCNC: 0.89 MG/DL (ref 0.57–1)
EOSINOPHIL # BLD AUTO: 0.2 X10E3/UL (ref 0–0.4)
EOSINOPHIL NFR BLD AUTO: 3 %
ERYTHROCYTE [DISTWIDTH] IN BLOOD BY AUTOMATED COUNT: 13.9 % (ref 11.7–15.4)
GLOBULIN SER CALC-MCNC: 3.4 G/DL (ref 1.5–4.5)
GLUCOSE SERPL-MCNC: 69 MG/DL (ref 65–99)
HCT VFR BLD AUTO: 44.1 % (ref 34–46.6)
HGB BLD-MCNC: 14.7 G/DL (ref 11.1–15.9)
IMM GRANULOCYTES # BLD AUTO: 0 X10E3/UL (ref 0–0.1)
IMM GRANULOCYTES NFR BLD AUTO: 0 %
LYMPHOCYTES # BLD AUTO: 2.1 X10E3/UL (ref 0.7–3.1)
LYMPHOCYTES NFR BLD AUTO: 31 %
MCH RBC QN AUTO: 31.7 PG (ref 26.6–33)
MCHC RBC AUTO-ENTMCNC: 33.3 G/DL (ref 31.5–35.7)
MCV RBC AUTO: 95 FL (ref 79–97)
MONOCYTES # BLD AUTO: 0.4 X10E3/UL (ref 0.1–0.9)
MONOCYTES NFR BLD AUTO: 6 %
NEUTROPHILS # BLD AUTO: 3.9 X10E3/UL (ref 1.4–7)
NEUTROPHILS NFR BLD AUTO: 58 %
PLATELET # BLD AUTO: 302 X10E3/UL (ref 150–450)
POTASSIUM SERPL-SCNC: 3.8 MMOL/L (ref 3.5–5.2)
PROT SERPL-MCNC: 7.8 G/DL (ref 6–8.5)
RBC # BLD AUTO: 4.63 X10E6/UL (ref 3.77–5.28)
SODIUM SERPL-SCNC: 147 MMOL/L (ref 134–144)
VALPROATE SERPL-MCNC: <4 UG/ML (ref 50–100)
WBC # BLD AUTO: 6.8 X10E3/UL (ref 3.4–10.8)

## 2021-03-31 ENCOUNTER — VIRTUAL VISIT (OUTPATIENT)
Dept: NEUROLOGY | Age: 31
End: 2021-03-31
Payer: COMMERCIAL

## 2021-03-31 DIAGNOSIS — G80.9 CEREBRAL PALSY, UNSPECIFIED TYPE (HCC): ICD-10-CM

## 2021-03-31 DIAGNOSIS — G80.1 CEREBRAL PALSY WITH SPASTIC DIPLEGIA (HCC): ICD-10-CM

## 2021-03-31 DIAGNOSIS — R56.9 SEIZURE (HCC): Primary | ICD-10-CM

## 2021-03-31 PROCEDURE — 99214 OFFICE O/P EST MOD 30 MIN: CPT | Performed by: PSYCHIATRY & NEUROLOGY

## 2021-03-31 NOTE — PROGRESS NOTES
Neurology Note    Chief Complaint   Patient presents with    Follow-up       SUBJECTIVE:    Angela Garg is a 31 y.o. female who presented to the neurology office for seizures. The patient does have cerebral palsy and at baseline is nonverbal.  She can walk but does have problems in eating and her food has to be broken down into smaller pieces. The patient is also incontinent for bowel and bladder. The patient started having seizures at the age of 5 and has been tried on multiple medications. The patient has tried Dilantin, Topamax, phenobarbital, Lamictal, Tegretol and last was taking Depakote and was doing pretty well. Depakote was discontinued around 2010 as the patient had not had seizures for a while. The patient was brought back again because in early March 2018, the mom went to the room and saw that she was clinching and tensing up her upper extremities. She did not lose her consciousness but had gaze deviation to one side. It lasted for around 1 minute. After that she was tired and went back to sleep. The patient did have an EEG and imaging of the brain in the past but they are not available to me. Interval history:  The patient is doing Depakote 500 mg p.o. twice daily and has not had any more staring spells. Current Outpatient Medications   Medication Sig    divalproex (DEPAKOTE SPRINKLE) 125 mg capsule TAKE 4 CAPSULES BY MOUTH TWICE DAILY.  risperiDONE (RISPERDAL) 3 mg tablet Take 3 mg by mouth four (4) times daily. No current facility-administered medications for this visit. EXAMINATION:   There were no vitals taken for this visit.      Virtual visit    Laboratory review:   Results for orders placed or performed in visit on 08/31/20   CBC WITH AUTOMATED DIFF   Result Value Ref Range    WBC 6.8 3.4 - 10.8 x10E3/uL    RBC 4.63 3.77 - 5.28 x10E6/uL    HGB 14.7 11.1 - 15.9 g/dL    HCT 44.1 34.0 - 46.6 %    MCV 95 79 - 97 fL    MCH 31.7 26.6 - 33.0 pg    MCHC 33.3 31.5 - 35.7 g/dL    RDW 13.9 11.7 - 15.4 %    PLATELET 914 963 - 508 x10E3/uL    NEUTROPHILS 58 Not Estab. %    Lymphocytes 31 Not Estab. %    MONOCYTES 6 Not Estab. %    EOSINOPHILS 3 Not Estab. %    BASOPHILS 2 Not Estab. %    ABS. NEUTROPHILS 3.9 1.4 - 7.0 x10E3/uL    Abs Lymphocytes 2.1 0.7 - 3.1 x10E3/uL    ABS. MONOCYTES 0.4 0.1 - 0.9 x10E3/uL    ABS. EOSINOPHILS 0.2 0.0 - 0.4 x10E3/uL    ABS. BASOPHILS 0.2 0.0 - 0.2 x10E3/uL    IMMATURE GRANULOCYTES 0 Not Estab. %    ABS. IMM. GRANS. 0.0 0.0 - 0.1 U34S9/MG   METABOLIC PANEL, COMPREHENSIVE   Result Value Ref Range    Glucose 69 65 - 99 mg/dL    BUN 14 6 - 20 mg/dL    Creatinine 0.89 0.57 - 1.00 mg/dL    GFR est non-AA 88 >59 mL/min/1.73    GFR est  >59 mL/min/1.73    BUN/Creatinine ratio 16 9 - 23    Sodium 147 (H) 134 - 144 mmol/L    Potassium 3.8 3.5 - 5.2 mmol/L    Chloride 110 (H) 96 - 106 mmol/L    CO2 16 (L) 20 - 29 mmol/L    Calcium 10.3 (H) 8.7 - 10.2 mg/dL    Protein, total 7.8 6.0 - 8.5 g/dL    Albumin 4.4 3.9 - 5.0 g/dL    GLOBULIN, TOTAL 3.4 1.5 - 4.5 g/dL    A-G Ratio 1.3 1.2 - 2.2    Bilirubin, total 0.2 0.0 - 1.2 mg/dL    Alk. phosphatase 114 39 - 117 IU/L    AST (SGOT) 15 0 - 40 IU/L    ALT (SGPT) 9 0 - 32 IU/L   VALPROIC ACID   Result Value Ref Range    Valproic acid <4 (L) 50 - 100 ug/mL       Imaging review:  None    Documentation review:  None    Assessment/Plan:   Roberto Henderson is a 31 y.o. female who presented to the neurology office for management of seizures. Patient does have cerebral palsy and has history of seizures. She is on Depakote 500 mg p.o. twice daily and is not having any more seizures or staring spells. Continue the same. Follow-up in 6 months        3 most recent PHQ Screens 7/27/2020   Little interest or pleasure in doing things Not at all   Feeling down, depressed, irritable, or hopeless Not at all   Total Score PHQ 2 0     Primary care to address possible depression if PHQ-9 score is more than 9.       ICD-10-CM ICD-9-CM    1. Seizure ([facility] [address]  R56.9 780.39    2. Cerebral palsy, unspecified type ([facility] [address]  G80.9 343.9       Thank you for allowing me to participate in the care of Ms. Yuliet[facility]. Please feel free to contact me if you have any questions. Forrest Valentin MD  Neurologist    CC: Solis Reyes MD  Fax: [phone]    This note was created using voice recognition software. Despite editing, there may be syntax errors. Sukumar Farrar was seen by synchronous (real-time) audio-video technology on 03/31/21. Consent:  She and/or her healthcare decision maker is aware that this patient-initiated Telehealth encounter is a billable service, with coverage as determined by her insurance carrier. She is aware that she may receive a bill and has provided verbal consent to proceed: Yes    I was in the office while conducting this encounter. Pursuant to the emergency declaration under the [facility] [address] waiver authority and the [employer] Act, this Virtual  Visit was conducted, with patient's consent, to reduce the patient's risk of exposure to COVID-19 and provide continuity of care for an established patient. Services were provided through a video synchronous discussion virtually to substitute for in-person clinic visit.

## 2021-04-16 DIAGNOSIS — R56.9 SEIZURE (HCC): ICD-10-CM

## 2021-04-16 RX ORDER — DIVALPROEX SODIUM 125 MG/1
CAPSULE, COATED PELLETS ORAL
Qty: 720 CAP | Refills: 0 | Status: SHIPPED | OUTPATIENT
Start: 2021-04-16 | End: 2021-12-30 | Stop reason: SDUPTHER

## 2021-04-26 ENCOUNTER — OFFICE VISIT (OUTPATIENT)
Dept: FAMILY MEDICINE CLINIC | Age: 31
End: 2021-04-26
Payer: COMMERCIAL

## 2021-04-26 VITALS
RESPIRATION RATE: 18 BRPM | HEIGHT: 60 IN | BODY MASS INDEX: 24.54 KG/M2 | HEART RATE: 111 BPM | DIASTOLIC BLOOD PRESSURE: 84 MMHG | WEIGHT: 125 LBS | SYSTOLIC BLOOD PRESSURE: 125 MMHG | TEMPERATURE: 97.4 F

## 2021-04-26 DIAGNOSIS — Z13.220 SCREENING FOR LIPID DISORDERS: ICD-10-CM

## 2021-04-26 DIAGNOSIS — Z13.1 SCREENING FOR DIABETES MELLITUS: ICD-10-CM

## 2021-04-26 DIAGNOSIS — G80.9 CEREBRAL PALSY, UNSPECIFIED TYPE (HCC): Primary | ICD-10-CM

## 2021-04-26 PROCEDURE — 99213 OFFICE O/P EST LOW 20 MIN: CPT | Performed by: FAMILY MEDICINE

## 2021-04-26 RX ORDER — RISPERIDONE 2 MG/1
TABLET, FILM COATED ORAL
COMMUNITY
Start: 2021-04-10 | End: 2021-04-26

## 2021-04-26 NOTE — PROGRESS NOTES
Chief Complaint   Patient presents with    Cerebral Aneurysm     Routine care for medical condition    Other     1. Have you been to the ER, urgent care clinic since your last visit? Hospitalized since your last visit? No    2. Have you seen or consulted any other health care providers outside of the [address] [place] [address] since your last visit? Include any pap smears or colon screening.  No

## 2021-04-26 NOTE — PROGRESS NOTES
Patient Name: Lee Saba   MRN: [id number]    Huber Jain is a 31 y.o. female who presents with the following: Here with parents. Patient has a history of cerebral palsy. Main concern is that she used to be able to walk without difficulty up until 3 years ago. Now she scoots around the floor and requires a wheelchair for transfer. Has seen orthopedics and done PT but this has not been helpful. Sees a neurologist for seizures. She is seen by psychiatry as well. No prior pap smear; has never been sexually active. Review of Systems   Unable to perform ROS: Patient nonverbal       The patient's medications, allergies, past medical history, surgical history, family history and social history were reviewed and updated where appropriate. Prior to Admission medications    Medication Sig Start Date End Date Taking? Authorizing Provider   divalproex (DEPAKOTE SPRINKLE) 125 mg capsule TAKE 4 CAPSULES BY MOUTH TWICE DAILY. 4/16/21  Yes Mikaela Anguiano MD   risperiDONE (RISPERDAL) 3 mg tablet Take 3 mg by mouth four (4) times daily. Yes Provider, Historical   risperiDONE (RisperDAL) 2 mg tablet  4/10/21 4/26/21  Provider, Historical       No Known Allergies      OBJECTIVE    Visit Vitals  /84 (BP 1 Location: Left upper arm, BP Patient Position: Sitting, BP Cuff Size: Adult)   Pulse (!) 111   Temp 97.4 °F (36.3 °C) (Temporal)   Resp 18   Ht 5' (1.524 m)   Wt 125 lb (56.7 kg) Comment: last recorded   LMP 03/27/2021   BMI 24.41 kg/m²       Physical Exam  Vitals signs and nursing note reviewed. Constitutional:       Appearance: She is normal weight. Comments: In wheelchair   HENT:      Head: Normocephalic and atraumatic. Eyes:      Pupils: Pupils are equal, round, and reactive to light. Pulmonary:      Effort: Pulmonary effort is normal.   Musculoskeletal: Normal range of motion. Skin:     General: Skin is warm and dry. Neurological:      Mental Status: She is alert. Psychiatric:      Comments: Agitated during exam.  Unable to cooperate. ASSESSMENT AND PLAN  Marely Lombardi is a 31 y.o. female who presents today for:    1. Cerebral palsy, unspecified type ([address]  Refer to [facility] PMR.  - REFERRAL TO PHYSICIAL MEDICINE REHAB    2. Screening for diabetes mellitus  - HEMOGLOBIN A1C WITH EAG; Future  - HEMOGLOBIN A1C WITH EAG    3. Screening for lipid disorders  - LIPID PANEL; Future  - LIPID PANEL       Medications Discontinued During This Encounter   Medication Reason    risperiDONE (RisperDAL) 2 mg tablet LIST CLEANUP           Treatment risks/benefits/costs/interactions/alternatives discussed with patient. Advised patient to call back or return to office if symptoms worsen/change/persist. If patient cannot reach us or should anything more severe/urgent arise he/she should proceed directly to the nearest emergency department. Discussed expected course/resolution/complications of diagnosis in detail with patient. Patient expressed understanding with the diagnosis and plan. Liat Orozco M.D.

## 2021-06-15 ENCOUNTER — VIRTUAL VISIT (OUTPATIENT)
Dept: FAMILY MEDICINE CLINIC | Age: 31
End: 2021-06-15
Payer: COMMERCIAL

## 2021-06-15 DIAGNOSIS — R39.81 FUNCTIONAL URINARY INCONTINENCE: Primary | ICD-10-CM

## 2021-06-15 PROCEDURE — 99213 OFFICE O/P EST LOW 20 MIN: CPT | Performed by: FAMILY MEDICINE

## 2021-06-15 NOTE — PROGRESS NOTES
Mary Valenzuela, who was evaluated through a synchronous (real-time) audio-video encounter, and/or her healthcare decision maker, is aware that it is a billable service, with coverage as determined by her insurance carrier. She provided verbal consent to proceed: YES, and patient identification was verified. It was conducted pursuant to the emergency declaration under the [facility], [facility] authority and the [employer] Act. A caregiver was present when appropriate. Ability to conduct physical exam was limited. I was at home. The patient was at home. This virtual visit was conducted via [employer]. Pursuant to the emergency declaration under the [facility], [address] [place] [address] authority and the [employer] Act, this Virtual  Visit was conducted to reduce the patient's risk of exposure to COVID-19 and provide continuity of care for an established patient. Services were provided through a video synchronous discussion virtually to substitute for in-person clinic visit. Due to this being a TeleHealth evaluation, many elements of the physical examination are unable to be assessed. Total Time: minutes: 5-10 minutes. James Benton MD    742  Subjective:   Mary Valenzuela was seen for: mother is present    Pt uses about 7 adult diapers per day for urinary incontinence. She will be switch to Dr. Antonio Galindo from Dr. Hakan Quesada with [facility] for her cerebral palsy. Prior to Admission medications    Medication Sig Start Date End Date Taking? Authorizing Provider   divalproex (DEPAKOTE SPRINKLE) 125 mg capsule TAKE 4 CAPSULES BY MOUTH TWICE DAILY. 4/16/21   Geovanny Patterson MD   risperiDONE (RISPERDAL) 3 mg tablet Take 3 mg by mouth four (4) times daily.     Provider, Historical       No Known Allergies    Review of Systems   Unable to perform ROS: Patient nonverbal       Physical Exam:     Patient-Reported Vitals 8/31/2020   Patient-Reported Weight 135 lb   Patient-Reported Height -          General: alert, cooperative, no distress   Mental  status: normal mood, behavior, speech, dress, motor activity, and thought processes, able to follow commands   HENT: NCAT   Neck: no visualized mass   Resp: no respiratory distress   Neuro: no gross deficits   Skin: no discoloration or lesions of concern on visible areas   Psychiatric: normal affect, consistent with stated mood, no evidence of hallucinations       Assessment & Plan:   Ervin Julien is a 31 y.o. female who presents today for:    1. Functional urinary incontinence  Mother has contacted [employer] to send over supply order form. There are no discontinued medications. Treatment risks/benefits/costs/interactions/alternatives discussed with patient. Advised patient to call back or return to office if symptoms worsen/change/persist. If patient cannot reach us or should anything more severe/urgent arise he/she should proceed directly to the nearest emergency department. Discussed expected course/resolution/complications of diagnosis in detail with patient. Patient expressed understanding with the diagnosis and plan. Liat Nolasco M.D.

## 2021-10-05 ENCOUNTER — TELEPHONE (OUTPATIENT)
Dept: NEUROLOGY | Age: 31
End: 2021-10-05

## 2021-10-05 ENCOUNTER — VIRTUAL VISIT (OUTPATIENT)
Dept: NEUROLOGY | Age: 31
End: 2021-10-05
Payer: COMMERCIAL

## 2021-10-05 DIAGNOSIS — R56.9 SEIZURE (HCC): Primary | ICD-10-CM

## 2021-10-05 DIAGNOSIS — G80.9 CEREBRAL PALSY, UNSPECIFIED TYPE (HCC): ICD-10-CM

## 2021-10-05 PROCEDURE — 99214 OFFICE O/P EST MOD 30 MIN: CPT | Performed by: PSYCHIATRY & NEUROLOGY

## 2021-10-05 NOTE — PROGRESS NOTES
Patient has had no falls or hospitalizations. Per patient mother Arthuro Hint she is stable and has had no seizures since starting the  Depakote. Patient has NKA and per parent is doing well.

## 2021-10-05 NOTE — PROGRESS NOTES
Neurology Note    Patient ID:  Gurmeet Nicole  [id number]  26 y.o.  1990      Date of Consultation:  October 5, 2021    This is a telemedicine visit that was performed with in the originating site at patient's home and the distance site at [facility] outpatient clinic at [facility].  This telemedicine visit utilized synchronous (real-time) audio-video technology. Verbal consent to participate in the video visit was obtained. This visit occurred during the corona (COVID -19) public health emergency. I discussed with the patient the nature of our telemedicine visit, that:  - I would evaluate the patient and recommend diagnostic and treatment based on my assessment  - Our sessions are not being recorded and that personal health information is protected  - Our team will provide follow-up care in person if and when the patient needs it. Consent:  The patient is aware that this patient-initiated Telehealth encounter is a billable service, with coverage as determined by the patient's insurance carrier. The patient is aware that they may receive a bill and has provided verbal consent to proceed:     Subjective:       History of Present Illness:   Gurmeet Nicole is a 31 y.o. female with a history of cerebral palsy and seizures who presents to neurology clinic for ongoing evaluation of seizures. Patient initially started having seizures at the age of 5 and had been trialed on multiple medications including Dilantin, Topamax, phenobarbital Lamictal and Tegretol. She is currently taking Depakote and not has not had seizures in several years. It appears that her last seizure was in 2018. She has had an EEG in the past and imaging however these images are not available for us. Interval history  Since patient was last seen in neurology clinic approximately 6 months ago she is doing well. She has not had any more episodes of staring or seizures.   Her mother reports however that she is not as mobile as she used to be and is spending more time in bed. She does report that she has significant spasticity in the lower extremities and wonders if this could be contributing to her symptoms. She is otherwise tolerating the Depakote well without any side effects. She denies any side effects of tremor. Past Medical History:   Diagnosis Date    Cerebral palsy ([address]     History of seizures     Mental developmental delay         Past Surgical History:   Procedure Laterality Date    HX HEENT  13 yrs old    Tonsillectomy and Adenoidectomy    HX ORTHOPAEDIC  2002    Feet w/ Skin Grafts        Family History   Problem Relation Age of Onset    No Known Problems Mother     No Known Problems Father         Social History     Tobacco Use    Smoking status: Never Smoker    Smokeless tobacco: Never Used   Substance Use Topics    Alcohol use: Not on file        No Known Allergies     Prior to Admission medications    Medication Sig Start Date End Date Taking? Authorizing Provider   divalproex (DEPAKOTE SPRINKLE) 125 mg capsule TAKE 4 CAPSULES BY MOUTH TWICE DAILY. 4/16/21  Yes Mohsen Pepper MD   risperiDONE (RISPERDAL) 3 mg tablet Take 3 mg by mouth four (4) times daily. Yes Provider, Historical       Review of Systems:    General, constitutional: negative  Eyes, vision: negative  Ears, nose, throat: negative  Cardiovascular, heart: negative  Respiratory: negative  Gastrointestinal: negative  Genitourinary: negative  Musculoskeletal: negative  Skin and integumentary: negative  Psychiatric: negative  Endocrine: negative  Neurological: negative, except for HPI  Hematologic/lymphatic: negative  Allergy/immunology: negative    Objective:     No vital signs were obtained via telemedicine today.      There are limitations to the neurological examination due to the technological features of telemedicine     Physical Exam:  General:  appears well nourished in no acute distress  Respiratory:  good respiratory effort. No labored breathing  Skin: intact. No obvious erythematous rashes     Neurological exam:  Awake, alert, however she is nonverbal at baseline. She was not able to follow any commands. Again she did not have any verbal output. .      Cranial nerves:   Visual fields appeared full. She did not have any obvious facial asymmetry. She was able to track her mother around the room. It was difficult for us to determine her pupillary function. Motor:   She appeared to be full strength in the upper and lower extremities. It was difficult to determine if there was any spasticity in the lower extremities however mother does report that there is tightness in her joints     Sensory:  Intact per patient     Reflexes:  Unable to obtain via telemedicine     Cerebellar testing: No obvious tremor was present however she was unable to do finger-nose-finger    Gait was not tested as she is largely nonambulatory      Labs:     Lab Results   Component Value Date/Time    Sodium 147 (H) 03/05/2021 10:21 AM    Potassium 3.8 03/05/2021 10:21 AM    Chloride 110 (H) 03/05/2021 10:21 AM    Glucose 69 03/05/2021 10:21 AM    BUN 14 03/05/2021 10:21 AM    Creatinine 0.89 03/05/2021 10:21 AM    Calcium 10.3 (H) 03/05/2021 10:21 AM    WBC 6.8 03/05/2021 10:21 AM    HCT 44.1 03/05/2021 10:21 AM    HGB 14.7 03/05/2021 10:21 AM    PLATELET 054 96/42/7949 10:21 AM       Imaging:    No results found for this or any previous visit. No results found for this or any previous visit. Assessment and Plan   Angela Garg is a 31 y.o. female with a history of cerebral palsy and seizures who presents to neurology clinic for ongoing evaluation of seizures. Etiology of this is likely due to her history of cerebral palsy and a primary generalized epilepsy.  -I did recommend that she continue with the Depakote 500 mg twice a day. Her last level actually was undetectable. -We will also recheck her Depakote levels today.   I have also placed an order for a CMP and CBC to ensure that there is no evidence of hepatic dysfunction or thrombocytopenia associated with the Depakote. -Given her lack of mobility could be due to spasticity, I have referred patient for EMG guided Botox of the lower extremities to see if there would be any benefit in the spasticity. This may help her with her ability to walk. -When she receives the Botox she may benefit from physical therapy to improve her mobility.  -We did discuss that if she has ongoing seizures we may need to do additional imaging such as an MRI and EEG. Her mother reports that she cannot do this without sedation.  -We also did discuss seizure precautions.       Return to clinic in 6 months      Patient Active Problem List   Diagnosis Code    C[facility]bral palsy ([facility] G80.9    Mental developmental delay F81.9    History of seizures Z87.898    Functional urinary incontinence R39.81        Signed By:  Yung Cross MD     October 5, 2021

## 2021-10-07 NOTE — PATIENT INSTRUCTIONS

## 2021-10-20 ENCOUNTER — TELEPHONE (OUTPATIENT)
Dept: NEUROLOGY | Age: 31
End: 2021-10-20

## 2021-10-20 NOTE — TELEPHONE ENCOUNTER
Re: Botox    Called [employer] CPTs [id number] and [phone] No PA required. Call Ref [id number]    [address] [place] [address] - because we do not buy and bill, the PA is handled  By [employer] (as subscriber is a [employer] employee). Faxed Rx and demo sheet to [employer] who will fill it after approval.  Ph [phone]  Faxed to [address] [place] [address] Dept ph [phone]. Called them - they will fax me the PA form to complete. Turnaround time is 2 business days. Botox appt 11/5    Total time on phone: 35 minutes.

## 2021-10-21 DIAGNOSIS — G80.9 CEREBRAL PALSY, UNSPECIFIED TYPE (HCC): ICD-10-CM

## 2021-10-21 DIAGNOSIS — G80.1 CEREBRAL PALSY WITH SPASTIC DIPLEGIA (HCC): ICD-10-CM

## 2021-10-22 DIAGNOSIS — G80.1 SPASTIC DIPLEGIA SYNDROME (HCC): ICD-10-CM

## 2021-10-22 DIAGNOSIS — G80.1 SPASTIC DIPLEGIA SYNDROME (HCC): Primary | ICD-10-CM

## 2021-10-22 RX ORDER — BOTULINUM TOXIN TYPE A 500 U/1
500 INJECTION, POWDER, LYOPHILIZED, FOR SOLUTION INTRAMUSCULAR
Qty: 1 EACH | Refills: 3 | Status: SHIPPED | OUTPATIENT
Start: 2021-10-22 | End: 2022-01-31 | Stop reason: ALTCHOICE

## 2021-10-22 RX ORDER — BOTULINUM TOXIN TYPE A 500 U/1
500 INJECTION, POWDER, LYOPHILIZED, FOR SOLUTION INTRAMUSCULAR
Qty: 1 EACH | Refills: 3 | Status: SHIPPED | OUTPATIENT
Start: 2021-10-22 | End: 2021-10-22 | Stop reason: SDUPTHER

## 2021-10-23 ENCOUNTER — TELEPHONE (OUTPATIENT)
Dept: NEUROLOGY | Age: 31
End: 2021-10-23

## 2021-10-23 NOTE — TELEPHONE ENCOUNTER
[facility] faxed the wrong form - showed 300 units of Dysport - should be 500 units. I faxed urgent request back to [facility] to request form be corrected to 500 units. Scanned Rx and request to chart.

## 2021-10-27 ENCOUNTER — TELEPHONE (OUTPATIENT)
Dept: NEUROLOGY | Age: 31
End: 2021-10-27

## 2021-10-27 NOTE — TELEPHONE ENCOUNTER
Dysport 500 units requested faxed to [employer]. 13 pages. Marked to please rush as we need by 11/3/21. Status is pending.

## 2021-11-01 ENCOUNTER — TELEPHONE (OUTPATIENT)
Dept: NEUROLOGY | Age: 31
End: 2021-11-01

## 2021-11-02 ENCOUNTER — TELEPHONE (OUTPATIENT)
Dept: NEUROLOGY | Age: 31
End: 2021-11-02

## 2021-11-02 NOTE — TELEPHONE ENCOUNTER
Re:  Dysport     Called [facility] to check status. Because patient has a Medicaid plan and [facility] has no ability to bill Medicaid, it (the PA request) is handled by another dept. They transferred my paperwork to that special dept. When I spoke with that dept, they only show a Rx for Botox not Dysport. Once it is approved, [facility] will be the specialty pharmacy. I was asked to fax the request to [phone] and nurse can call in the Rx to [phone] option 2. Faxed 21 pages to include the Dysport Rx - fax confirmation page rec'd and all scanned to chart.

## 2021-11-03 ENCOUNTER — TELEPHONE (OUTPATIENT)
Dept: NEUROLOGY | Age: 31
End: 2021-11-03

## 2021-11-03 NOTE — TELEPHONE ENCOUNTER
----- Message from Rosemarie Miller sent at 11/3/2021 12:00 PM EDT -----  Regarding: Dr. Garrison Hollow: [phone]  General Message/Vendor Calls    Caller's first and last name: [address]      Reason for call: In response to call from [facility] checking status of Botox - 10/26/21 [employer] stated Botox is not approved at [facility]. Dysport was sent to [facility] instead. [address] is not in network for either of those medications - presciption requests are being closed. If Botox and Dysport is needed, please send to [address] [place] [address] required yes/no and why: no/unless additional info is needed.        Best contact number(s): [phone]      Details to clarify the request: N/A      Rosemarie Miller

## 2021-11-04 ENCOUNTER — TELEPHONE (OUTPATIENT)
Dept: NEUROLOGY | Age: 31
End: 2021-11-04

## 2021-11-04 NOTE — TELEPHONE ENCOUNTER
----- Message from Michelle Banda sent at 11/4/2021 10:41 AM EDT -----  Regarding: /telephone  Patient return call    Caller's first and last name and relationship (if not the patient):Ary Brittaney Batres( pt's mother)      Best contact number(s):[phone]      Whose call is being returned:Dr. Larry Hammond      Details to clarify the request: Pt's mother is returning a call to Dr. Anuradha Nava office.       Michelle Banda

## 2021-11-04 NOTE — TELEPHONE ENCOUNTER
Left voicemail on patient's mother's cell phone 11/4/21; detailing the difficulties that Vinicio Servin has been having with [facility] and their insurances and the approval process. The switching between [facility], [facility] and not to [facility]. I also instructed that her appointment for 11/5/21 will be rescheduled when we can get the botox in our office.

## 2021-11-04 NOTE — TELEPHONE ENCOUNTER
----- Message from Nafisa Perez sent at 11/4/2021 10:41 AM EDT -----  Regarding: /telephone  Patient return call    Caller's first and last name and relationship (if not the patient):Anitha Mcallister Nichole( pt's mother)      Best contact number(s):[phone]      Whose call is being returned:[facility]. [facility]      Details to clarify the request: Pt's mother is returning a call to Dr. Chase Fox office.       Nafisa Perez

## 2021-11-04 NOTE — TELEPHONE ENCOUNTER
Re: Dysport    After [facility] told me they can't process the PA request because \"they can't bill her secondary Medicaid plan\" which is [facility], they internally transferred the request to their 'special team' - I spoke w/ the 'special team' yesterday and today the nurse received a call that it was [facility] and we were advised they cannot service Dysport or Botox. I called back to [place] [address] and s/w benefits dept - to see if there was another specialty pharmacy who could service the order. Was given [employer] [facility] which is acutally [facility]. Called [facility], s/w [employer] at [phone] - she advised to fax them the Dysport Rx and they will do the investigation to see if they can process it. She did state they can no longer ship Botox as of 4/1/21 but they CAN process / ship Dysport once approved. I faxed Rx to [phone] at 10:20 am. Fax confirmation received. [employer] said do not send any notes yet - as they will be in touch with me once they do the investigation and receive the Rx. I will review with Shanna Aguilar RN w/ [facility] when she visits the office today for verification and further discussion. Charmaine Sharif,     Please call pt's father (he is on the HIPAA) and inform him that [facility] cannot handle the order and we are attempting to see if [facility] can authorize it and if so, [facility] will contact him to set up the arrangements. You can also advise that there has been great delay with getting any updates from [facility] as I submitted the request to them on 10/20 - 14 days ago and had multiple calls to them for status updates.

## 2021-11-05 NOTE — TELEPHONE ENCOUNTER
Called patient's mother back; reiterated prior authorization process difficulty, and issues with approval.

## 2021-11-15 ENCOUNTER — TELEPHONE (OUTPATIENT)
Dept: NEUROLOGY | Age: 31
End: 2021-11-15

## 2021-11-15 NOTE — TELEPHONE ENCOUNTER
Re: Dysport    Rec'd call from [employer]. At [facility] [place] - she advised [facility] now has a paid claim and can ship it. I contacted pt's mother, and gave her the auth # and date range, my direct dial number, and to expect call from [facility] to get her verbal consent to ship, gave her our delivery address and if any problem or push back to please let me know. I told her that Ricardo Nam would be contacting [facility] to order the medication and then calling them to set up an appointment. The Sandrita Siegel is valid to 4/29/22, and I mentioned we would most likely be able to use this auth at least twice, but that would be a decision clinically based on her future procedural appt w/ Dr. Ej Bauman.

## 2021-11-15 NOTE — TELEPHONE ENCOUNTER
Dysport    An active authorization already exists for this patient. Proceed with prescribing the Medication or call Pharmacy Customer Care at [phone]. MPA: [id number] Effective: 10/29/2021 12:00:00 AM Terminate: 4/29/2022 12:00:00 AM    ID [id number]   [employer] [phone] -     Was told that once this is approved, that [facility] would need to call [employer] to request an override. The approval is as noted above    Zenaida Gomez is [employer] at [phone]. She placed me on hold and s/w the SPP w/ [employer] and [facility] and what they are telling her is incorrect. She is working to get it corrected and promised to call me back - that [facility] needs to call [employer] and request an override. I gave Roro Elmore my dd [phone].

## 2021-11-18 ENCOUNTER — TELEPHONE (OUTPATIENT)
Dept: NEUROLOGY | Age: 31
End: 2021-11-18

## 2021-11-19 ENCOUNTER — TELEPHONE (OUTPATIENT)
Dept: NEUROLOGY | Age: 31
End: 2021-11-19

## 2021-11-19 NOTE — TELEPHONE ENCOUNTER
Dysport arrived 11/19/21  One vial 500 units  MANU: MULUGETA  SP: NILTON  LOT: [id number]  EXP:06/30/2022  RX: [id number]  APPT: 12/10/2021

## 2021-12-10 ENCOUNTER — OFFICE VISIT (OUTPATIENT)
Dept: NEUROLOGY | Age: 31
End: 2021-12-10

## 2021-12-10 DIAGNOSIS — G80.9 CEREBRAL PALSY, UNSPECIFIED TYPE (HCC): Primary | ICD-10-CM

## 2021-12-10 NOTE — LETTER
12/13/2021    Patient: Judith Evangelista   YOB: 1990   Date of Visit: 12/10/2021     Kajal Bishop MD  [facility]    Dear Kajal Bishop MD,      Thank you for referring Ms. Judith Evangelista to [address] [place] [address] for evaluation. My notes for this consultation are attached. If you have questions, please do not hesitate to call me. I look forward to following your patient along with you.       Sincerely,    Rex Chauhan, DO

## 2021-12-21 ENCOUNTER — TELEPHONE (OUTPATIENT)
Dept: NEUROLOGY | Age: 31
End: 2021-12-21

## 2021-12-21 DIAGNOSIS — G80.9 CEREBRAL PALSY, UNSPECIFIED TYPE (HCC): Primary | ICD-10-CM

## 2021-12-21 RX ORDER — DIAZEPAM 2 MG/1
2 TABLET ORAL
Qty: 1 TABLET | Refills: 0 | Status: SHIPPED | OUTPATIENT
Start: 2021-12-21 | End: 2021-12-21

## 2021-12-21 NOTE — TELEPHONE ENCOUNTER
Spoke with patient's family, history of use of Valium used for mild sedation. Trying with the botox to decrease agitation. Appointment on 2/4/21.

## 2021-12-21 NOTE — TELEPHONE ENCOUNTER
This is the patient that was due for her first botox, I spoke with the patient's mother, and to try the mild sedation in the office for her to be calm enough for the injections.

## 2021-12-30 DIAGNOSIS — R56.9 SEIZURE (HCC): ICD-10-CM

## 2021-12-30 RX ORDER — DIVALPROEX SODIUM 125 MG/1
CAPSULE, COATED PELLETS ORAL
Qty: 720 CAPSULE | Refills: 0 | Status: SHIPPED | OUTPATIENT
Start: 2021-12-30 | End: 2022-03-14 | Stop reason: SDUPTHER

## 2022-01-06 ENCOUNTER — TELEPHONE (OUTPATIENT)
Dept: FAMILY MEDICINE CLINIC | Age: 32
End: 2022-01-06

## 2022-01-06 NOTE — TELEPHONE ENCOUNTER
[facility] called and requested order for [address] bed.      FAX# [phone]

## 2022-01-11 NOTE — TELEPHONE ENCOUNTER
Attempted to call patient. Left voicemail to call the office back at [phone].     Please inform patient's mother that an appointment is required to discuss this or she can ask the patient's neurologist.

## 2022-01-22 ENCOUNTER — APPOINTMENT (OUTPATIENT)
Dept: GENERAL RADIOLOGY | Age: 32
DRG: 208 | End: 2022-01-22
Attending: EMERGENCY MEDICINE
Payer: COMMERCIAL

## 2022-01-22 ENCOUNTER — HOSPITAL ENCOUNTER (INPATIENT)
Age: 32
LOS: 3 days | Discharge: HOME | DRG: 208 | End: 2022-01-25
Attending: EMERGENCY MEDICINE | Admitting: ANESTHESIOLOGY
Payer: COMMERCIAL

## 2022-01-22 DIAGNOSIS — T17.908A ASPIRATION INTO AIRWAY, INITIAL ENCOUNTER: Primary | ICD-10-CM

## 2022-01-22 PROCEDURE — 94002 VENT MGMT INPAT INIT DAY: CPT

## 2022-01-22 PROCEDURE — 99284 EMERGENCY DEPT VISIT MOD MDM: CPT

## 2022-01-22 PROCEDURE — 74011000250 HC RX REV CODE- 250

## 2022-01-22 PROCEDURE — 87635 SARS-COV-2 COVID-19 AMP PRB: CPT

## 2022-01-22 PROCEDURE — 96372 THER/PROPH/DIAG INJ SC/IM: CPT

## 2022-01-22 PROCEDURE — 5A1935Z RESPIRATORY VENTILATION, LESS THAN 24 CONSECUTIVE HOURS: ICD-10-PCS | Performed by: EMERGENCY MEDICINE

## 2022-01-22 PROCEDURE — 65270000029 HC RM PRIVATE

## 2022-01-22 PROCEDURE — 0BH17EZ INSERTION OF ENDOTRACHEAL AIRWAY INTO TRACHEA, VIA NATURAL OR ARTIFICIAL OPENING: ICD-10-PCS | Performed by: EMERGENCY MEDICINE

## 2022-01-22 PROCEDURE — 74011250636 HC RX REV CODE- 250/636

## 2022-01-22 PROCEDURE — 71045 X-RAY EXAM CHEST 1 VIEW: CPT

## 2022-01-22 PROCEDURE — 0BC58ZZ EXTIRPATION OF MATTER FROM RIGHT MIDDLE LOBE BRONCHUS, VIA NATURAL OR ARTIFICIAL OPENING ENDOSCOPIC: ICD-10-PCS | Performed by: EMERGENCY MEDICINE

## 2022-01-22 PROCEDURE — 74011250636 HC RX REV CODE- 250/636: Performed by: EMERGENCY MEDICINE

## 2022-01-22 PROCEDURE — 31500 INSERT EMERGENCY AIRWAY: CPT

## 2022-01-22 PROCEDURE — 65610000006 HC RM INTENSIVE CARE

## 2022-01-22 RX ORDER — ENOXAPARIN SODIUM 100 MG/ML
40 INJECTION SUBCUTANEOUS DAILY
Status: DISCONTINUED | OUTPATIENT
Start: 2022-01-23 | End: 2022-01-25 | Stop reason: HOSPADM

## 2022-01-22 RX ORDER — PROPOFOL 10 MG/ML
0-50 VIAL (ML) INTRAVENOUS
Status: DISCONTINUED | OUTPATIENT
Start: 2022-01-22 | End: 2022-01-23

## 2022-01-22 RX ORDER — SODIUM CHLORIDE, SODIUM LACTATE, POTASSIUM CHLORIDE, CALCIUM CHLORIDE 600; 310; 30; 20 MG/100ML; MG/100ML; MG/100ML; MG/100ML
100 INJECTION, SOLUTION INTRAVENOUS CONTINUOUS
Status: DISCONTINUED | OUTPATIENT
Start: 2022-01-22 | End: 2022-01-23

## 2022-01-22 RX ORDER — MIDAZOLAM HYDROCHLORIDE 1 MG/ML
5 INJECTION, SOLUTION INTRAMUSCULAR; INTRAVENOUS
Status: COMPLETED | OUTPATIENT
Start: 2022-01-22 | End: 2022-01-22

## 2022-01-22 RX ORDER — POLYETHYLENE GLYCOL 3350 17 G/17G
17 POWDER, FOR SOLUTION ORAL DAILY PRN
Status: DISCONTINUED | OUTPATIENT
Start: 2022-01-22 | End: 2022-01-25 | Stop reason: HOSPADM

## 2022-01-22 RX ORDER — FENTANYL CITRATE 50 UG/ML
50 INJECTION, SOLUTION INTRAMUSCULAR; INTRAVENOUS ONCE
Status: COMPLETED | OUTPATIENT
Start: 2022-01-22 | End: 2022-01-22

## 2022-01-22 RX ORDER — ACETAMINOPHEN 325 MG/1
650 TABLET ORAL
Status: DISCONTINUED | OUTPATIENT
Start: 2022-01-22 | End: 2022-01-25 | Stop reason: HOSPADM

## 2022-01-22 RX ORDER — ONDANSETRON 2 MG/ML
8 INJECTION INTRAMUSCULAR; INTRAVENOUS
Status: ACTIVE | OUTPATIENT
Start: 2022-01-22 | End: 2022-01-23

## 2022-01-22 RX ORDER — IPRATROPIUM BROMIDE AND ALBUTEROL SULFATE 2.5; .5 MG/3ML; MG/3ML
3 SOLUTION RESPIRATORY (INHALATION)
Status: DISCONTINUED | OUTPATIENT
Start: 2022-01-22 | End: 2022-01-25 | Stop reason: HOSPADM

## 2022-01-22 RX ORDER — ONDANSETRON 4 MG/1
4 TABLET, ORALLY DISINTEGRATING ORAL
Status: DISCONTINUED | OUTPATIENT
Start: 2022-01-22 | End: 2022-01-25 | Stop reason: HOSPADM

## 2022-01-22 RX ORDER — ONDANSETRON 2 MG/ML
4 INJECTION INTRAMUSCULAR; INTRAVENOUS
Status: DISCONTINUED | OUTPATIENT
Start: 2022-01-22 | End: 2022-01-25 | Stop reason: HOSPADM

## 2022-01-22 RX ORDER — LORAZEPAM 2 MG/ML
INJECTION INTRAMUSCULAR
Status: COMPLETED
Start: 2022-01-22 | End: 2022-01-22

## 2022-01-22 RX ORDER — SODIUM CHLORIDE 0.9 % (FLUSH) 0.9 %
5-40 SYRINGE (ML) INJECTION EVERY 8 HOURS
Status: DISCONTINUED | OUTPATIENT
Start: 2022-01-22 | End: 2022-01-25 | Stop reason: HOSPADM

## 2022-01-22 RX ORDER — SUCCINYLCHOLINE CHLORIDE 20 MG/ML
INJECTION INTRAMUSCULAR; INTRAVENOUS
Status: COMPLETED
Start: 2022-01-22 | End: 2022-01-22

## 2022-01-22 RX ORDER — LORAZEPAM 2 MG/ML
1 INJECTION INTRAMUSCULAR ONCE
Status: COMPLETED | OUTPATIENT
Start: 2022-01-22 | End: 2022-01-22

## 2022-01-22 RX ORDER — ETOMIDATE 2 MG/ML
INJECTION INTRAVENOUS
Status: COMPLETED
Start: 2022-01-22 | End: 2022-01-22

## 2022-01-22 RX ORDER — SODIUM CHLORIDE 0.9 % (FLUSH) 0.9 %
5-40 SYRINGE (ML) INJECTION AS NEEDED
Status: DISCONTINUED | OUTPATIENT
Start: 2022-01-22 | End: 2022-01-25 | Stop reason: HOSPADM

## 2022-01-22 RX ORDER — ACETAMINOPHEN 650 MG/1
650 SUPPOSITORY RECTAL
Status: DISCONTINUED | OUTPATIENT
Start: 2022-01-22 | End: 2022-01-25 | Stop reason: HOSPADM

## 2022-01-22 RX ADMIN — ETOMIDATE: 2 INJECTION, SOLUTION INTRAVENOUS at 23:08

## 2022-01-22 RX ADMIN — SUCCINYLCHOLINE CHLORIDE 200 MG: 20 INJECTION, SOLUTION INTRAMUSCULAR; INTRAVENOUS at 23:08

## 2022-01-22 RX ADMIN — PROPOFOL 25 MCG/KG/MIN: 10 INJECTION, EMULSION INTRAVENOUS at 23:28

## 2022-01-22 RX ADMIN — LORAZEPAM 1 MG: 2 INJECTION INTRAMUSCULAR at 22:43

## 2022-01-22 RX ADMIN — FENTANYL CITRATE 50 MCG: 50 INJECTION INTRAMUSCULAR; INTRAVENOUS at 23:24

## 2022-01-22 RX ADMIN — MIDAZOLAM 5 MG: 1 INJECTION INTRAMUSCULAR; INTRAVENOUS at 23:27

## 2022-01-22 RX ADMIN — LORAZEPAM 1 MG: 2 INJECTION INTRAMUSCULAR; INTRAVENOUS at 22:43

## 2022-01-23 ENCOUNTER — APPOINTMENT (OUTPATIENT)
Dept: GENERAL RADIOLOGY | Age: 32
DRG: 208 | End: 2022-01-23
Attending: ANESTHESIOLOGY
Payer: COMMERCIAL

## 2022-01-23 ENCOUNTER — APPOINTMENT (OUTPATIENT)
Dept: GENERAL RADIOLOGY | Age: 32
DRG: 208 | End: 2022-01-23
Payer: COMMERCIAL

## 2022-01-23 LAB
ALBUMIN SERPL-MCNC: 3.4 G/DL (ref 3.5–5)
ALBUMIN/GLOB SERPL: 0.9 {RATIO} (ref 1.1–2.2)
ALP SERPL-CCNC: 98 U/L (ref 45–117)
ALT SERPL-CCNC: 14 U/L (ref 12–78)
ANION GAP SERPL CALC-SCNC: 6 MMOL/L (ref 5–15)
APPEARANCE UR: ABNORMAL
ARTERIAL PATENCY WRIST A: POSITIVE
ARTERIAL PATENCY WRIST A: YES
AST SERPL-CCNC: 17 U/L (ref 15–37)
ATRIAL RATE: 104 BPM
BACTERIA URNS QL MICRO: NEGATIVE /HPF
BASE DEFICIT BLD-SCNC: 5.3 MMOL/L
BASE DEFICIT BLDA-SCNC: 6.2 MMOL/L
BASOPHILS # BLD: 0.1 K/UL (ref 0–0.1)
BASOPHILS # BLD: NORMAL K/UL (ref 0–0.1)
BASOPHILS NFR BLD: 1 % (ref 0–1)
BASOPHILS NFR BLD: NORMAL % (ref 0–1)
BDY SITE: ABNORMAL
BDY SITE: ABNORMAL
BILIRUB SERPL-MCNC: 0.4 MG/DL (ref 0.2–1)
BILIRUB UR QL: NEGATIVE
BUN SERPL-MCNC: 16 MG/DL (ref 6–20)
BUN/CREAT SERPL: 25 (ref 12–20)
CALCIUM SERPL-MCNC: 8.7 MG/DL (ref 8.5–10.1)
CALCULATED P AXIS, ECG09: 74 DEGREES
CALCULATED R AXIS, ECG10: 75 DEGREES
CALCULATED T AXIS, ECG11: 30 DEGREES
CAOX CRY URNS QL MICRO: ABNORMAL
CHLORIDE SERPL-SCNC: 114 MMOL/L (ref 97–108)
CO2 SERPL-SCNC: 21 MMOL/L (ref 21–32)
COLOR UR: ABNORMAL
COVID-19 RAPID TEST, COVR: NOT DETECTED
CREAT SERPL-MCNC: 0.65 MG/DL (ref 0.55–1.02)
DIAGNOSIS, 93000: NORMAL
DIFFERENTIAL METHOD BLD: ABNORMAL
DIFFERENTIAL METHOD BLD: NORMAL
EOSINOPHIL # BLD: 0.1 K/UL (ref 0–0.4)
EOSINOPHIL # BLD: NORMAL K/UL (ref 0–0.4)
EOSINOPHIL NFR BLD: 0 % (ref 0–7)
EOSINOPHIL NFR BLD: NORMAL % (ref 0–7)
EPITH CASTS URNS QL MICRO: ABNORMAL /LPF
ERYTHROCYTE [DISTWIDTH] IN BLOOD BY AUTOMATED COUNT: 13.4 % (ref 11.5–14.5)
ERYTHROCYTE [DISTWIDTH] IN BLOOD BY AUTOMATED COUNT: NORMAL % (ref 11.5–14.5)
FIO2 ON VENT: 40 %
GAS FLOW.O2 O2 DELIVERY SYS: ABNORMAL L/MIN
GAS FLOW.O2 SETTING OXYMISER: 22 BPM
GAS FLOW.O2 SETTING OXYMISER: 22 L/MIN
GLOBULIN SER CALC-MCNC: 4 G/DL (ref 2–4)
GLUCOSE SERPL-MCNC: 122 MG/DL (ref 65–100)
GLUCOSE UR STRIP.AUTO-MCNC: NEGATIVE MG/DL
HCO3 BLD-SCNC: 20.9 MMOL/L (ref 22–26)
HCO3 BLDA-SCNC: 20 MMOL/L (ref 22–26)
HCT VFR BLD AUTO: 40.8 % (ref 35–47)
HCT VFR BLD AUTO: NORMAL % (ref 35–47)
HGB BLD-MCNC: 13.7 G/DL (ref 11.5–16)
HGB BLD-MCNC: NORMAL G/DL (ref 11.5–16)
HGB UR QL STRIP: NEGATIVE
IMM GRANULOCYTES # BLD AUTO: 0.1 K/UL (ref 0–0.04)
IMM GRANULOCYTES # BLD AUTO: NORMAL K/UL (ref 0–0.04)
IMM GRANULOCYTES NFR BLD AUTO: 0 % (ref 0–0.5)
IMM GRANULOCYTES NFR BLD AUTO: NORMAL % (ref 0–0.5)
KETONES UR QL STRIP.AUTO: 15 MG/DL
LEUKOCYTE ESTERASE UR QL STRIP.AUTO: NEGATIVE
LYMPHOCYTES # BLD: 1.5 K/UL (ref 0.8–3.5)
LYMPHOCYTES # BLD: NORMAL K/UL (ref 0.8–3.5)
LYMPHOCYTES NFR BLD: 9 % (ref 12–49)
LYMPHOCYTES NFR BLD: NORMAL % (ref 12–49)
MCH RBC QN AUTO: 30.9 PG (ref 26–34)
MCH RBC QN AUTO: NORMAL PG (ref 26–34)
MCHC RBC AUTO-ENTMCNC: 33.6 G/DL (ref 30–36.5)
MCHC RBC AUTO-ENTMCNC: NORMAL G/DL (ref 30–36.5)
MCV RBC AUTO: 91.9 FL (ref 80–99)
MCV RBC AUTO: NORMAL FL (ref 80–99)
MONOCYTES # BLD: 1 K/UL (ref 0–1)
MONOCYTES # BLD: NORMAL K/UL (ref 0–1)
MONOCYTES NFR BLD: 6 % (ref 5–13)
MONOCYTES NFR BLD: NORMAL % (ref 5–13)
MUCOUS THREADS URNS QL MICRO: ABNORMAL /LPF
NEUTS SEG # BLD: 14.8 K/UL (ref 1.8–8)
NEUTS SEG # BLD: NORMAL K/UL (ref 1.8–8)
NEUTS SEG NFR BLD: 84 % (ref 32–75)
NEUTS SEG NFR BLD: NORMAL % (ref 32–75)
NITRITE UR QL STRIP.AUTO: NEGATIVE
NRBC # BLD: 0 K/UL (ref 0–0.01)
NRBC # BLD: NORMAL K/UL (ref 0–0.01)
NRBC BLD-RTO: 0 PER 100 WBC
NRBC BLD-RTO: NORMAL PER 100 WBC
O2/TOTAL GAS SETTING VFR VENT: 100 %
P-R INTERVAL, ECG05: 124 MS
PCO2 BLD: 42.3 MMHG (ref 35–45)
PCO2 BLDA: 40 MMHG (ref 35–45)
PEEP RESPIRATORY: 10 CMH2O
PEEP RESPIRATORY: 5 CM[H2O]
PH BLD: 7.3 [PH] (ref 7.35–7.45)
PH BLDA: 7.31 [PH] (ref 7.35–7.45)
PH UR STRIP: 5.5 [PH] (ref 5–8)
PLATELET # BLD AUTO: 248 K/UL (ref 150–400)
PLATELET # BLD AUTO: NORMAL K/UL (ref 150–400)
PMV BLD AUTO: 10.7 FL (ref 8.9–12.9)
PMV BLD AUTO: NORMAL FL (ref 8.9–12.9)
PO2 BLD: 322 MMHG (ref 80–100)
PO2 BLDA: 144 MMHG (ref 80–100)
POTASSIUM SERPL-SCNC: 3.8 MMOL/L (ref 3.5–5.1)
PROCALCITONIN SERPL-MCNC: <0.05 NG/ML
PROT SERPL-MCNC: 7.4 G/DL (ref 6.4–8.2)
PROT UR STRIP-MCNC: ABNORMAL MG/DL
Q-T INTERVAL, ECG07: 346 MS
QRS DURATION, ECG06: 72 MS
QTC CALCULATION (BEZET), ECG08: 454 MS
RBC # BLD AUTO: 4.44 M/UL (ref 3.8–5.2)
RBC # BLD AUTO: NORMAL M/UL (ref 3.8–5.2)
RBC #/AREA URNS HPF: ABNORMAL /HPF (ref 0–5)
RBC MORPH BLD: NORMAL
RBC MORPH BLD: NORMAL
SAO2 % BLD: 99 % (ref 92–97)
SAO2 % BLD: 99.9 % (ref 92–97)
SAO2% DEVICE SAO2% SENSOR NAME: ABNORMAL
SODIUM SERPL-SCNC: 141 MMOL/L (ref 136–145)
SOURCE, COVRS: NORMAL
SP GR UR REFRACTOMETRY: 1.03
SPECIMEN SITE: ABNORMAL
SPECIMEN TYPE: ABNORMAL
TROPONIN-HIGH SENSITIVITY: 20 NG/L (ref 0–51)
UR CULT HOLD, URHOLD: NORMAL
UROBILINOGEN UR QL STRIP.AUTO: 1 EU/DL (ref 0.2–1)
VALPROATE SERPL-MCNC: 68 UG/ML (ref 50–100)
VENTILATION MODE VENT: ABNORMAL
VENTILATION MODE VENT: ABNORMAL
VENTRICULAR RATE, ECG03: 104 BPM
VT SETTING VENT: 350 ML
VT SETTING VENT: 350 ML
WBC # BLD AUTO: 17.5 K/UL (ref 3.6–11)
WBC # BLD AUTO: NORMAL K/UL (ref 3.6–11)
WBC URNS QL MICRO: ABNORMAL /HPF (ref 0–4)

## 2022-01-23 PROCEDURE — 74011000250 HC RX REV CODE- 250: Performed by: NURSE PRACTITIONER

## 2022-01-23 PROCEDURE — 94003 VENT MGMT INPAT SUBQ DAY: CPT

## 2022-01-23 PROCEDURE — 81001 URINALYSIS AUTO W/SCOPE: CPT

## 2022-01-23 PROCEDURE — 36600 WITHDRAWAL OF ARTERIAL BLOOD: CPT

## 2022-01-23 PROCEDURE — 74011250636 HC RX REV CODE- 250/636: Performed by: NURSE PRACTITIONER

## 2022-01-23 PROCEDURE — 65270000029 HC RM PRIVATE

## 2022-01-23 PROCEDURE — 74011000258 HC RX REV CODE- 258: Performed by: NURSE PRACTITIONER

## 2022-01-23 PROCEDURE — 36415 COLL VENOUS BLD VENIPUNCTURE: CPT

## 2022-01-23 PROCEDURE — 74011250636 HC RX REV CODE- 250/636: Performed by: ANESTHESIOLOGY

## 2022-01-23 PROCEDURE — 31625 BRONCHOSCOPY W/BIOPSY(S): CPT

## 2022-01-23 PROCEDURE — 80053 COMPREHEN METABOLIC PANEL: CPT

## 2022-01-23 PROCEDURE — 84484 ASSAY OF TROPONIN QUANT: CPT

## 2022-01-23 PROCEDURE — 71045 X-RAY EXAM CHEST 1 VIEW: CPT

## 2022-01-23 PROCEDURE — 85025 COMPLETE CBC W/AUTO DIFF WBC: CPT

## 2022-01-23 PROCEDURE — 80164 ASSAY DIPROPYLACETIC ACD TOT: CPT

## 2022-01-23 PROCEDURE — 82803 BLOOD GASES ANY COMBINATION: CPT

## 2022-01-23 PROCEDURE — 84145 PROCALCITONIN (PCT): CPT

## 2022-01-23 PROCEDURE — 74011250636 HC RX REV CODE- 250/636: Performed by: EMERGENCY MEDICINE

## 2022-01-23 PROCEDURE — 93005 ELECTROCARDIOGRAM TRACING: CPT

## 2022-01-23 RX ORDER — METOPROLOL TARTRATE 5 MG/5ML
2.5 INJECTION INTRAVENOUS ONCE
Status: COMPLETED | OUTPATIENT
Start: 2022-01-23 | End: 2022-01-23

## 2022-01-23 RX ORDER — LORAZEPAM 2 MG/ML
1-2 INJECTION INTRAMUSCULAR
Status: DISCONTINUED | OUTPATIENT
Start: 2022-01-23 | End: 2022-01-25 | Stop reason: HOSPADM

## 2022-01-23 RX ORDER — FENTANYL CITRATE 50 UG/ML
INJECTION, SOLUTION INTRAMUSCULAR; INTRAVENOUS
Status: DISPENSED
Start: 2022-01-23 | End: 2022-01-23

## 2022-01-23 RX ORDER — FENTANYL CITRATE 50 UG/ML
100 INJECTION, SOLUTION INTRAMUSCULAR; INTRAVENOUS ONCE
Status: COMPLETED | OUTPATIENT
Start: 2022-01-23 | End: 2022-01-23

## 2022-01-23 RX ADMIN — PIPERACILLIN AND TAZOBACTAM 3.38 G: 3; .375 INJECTION, POWDER, LYOPHILIZED, FOR SOLUTION INTRAVENOUS at 23:45

## 2022-01-23 RX ADMIN — SODIUM CHLORIDE 500 MG: 9 INJECTION, SOLUTION INTRAVENOUS at 08:28

## 2022-01-23 RX ADMIN — SODIUM CHLORIDE, PRESERVATIVE FREE 20 MG: 5 INJECTION INTRAVENOUS at 08:28

## 2022-01-23 RX ADMIN — LORAZEPAM 2 MG: 2 INJECTION INTRAMUSCULAR; INTRAVENOUS at 04:49

## 2022-01-23 RX ADMIN — SODIUM CHLORIDE, PRESERVATIVE FREE 10 ML: 5 INJECTION INTRAVENOUS at 06:14

## 2022-01-23 RX ADMIN — ENOXAPARIN SODIUM 40 MG: 100 INJECTION SUBCUTANEOUS at 08:28

## 2022-01-23 RX ADMIN — FENTANYL CITRATE 100 MCG: 50 INJECTION, SOLUTION INTRAMUSCULAR; INTRAVENOUS at 00:37

## 2022-01-23 RX ADMIN — AMPICILLIN AND SULBACTAM 3 G: 1; 2 INJECTION, POWDER, FOR SOLUTION INTRAMUSCULAR; INTRAVENOUS at 01:43

## 2022-01-23 RX ADMIN — METOPROLOL TARTRATE 2.5 MG: 5 INJECTION, SOLUTION INTRAVENOUS at 16:37

## 2022-01-23 RX ADMIN — SODIUM CHLORIDE, PRESERVATIVE FREE 20 MG: 5 INJECTION INTRAVENOUS at 18:44

## 2022-01-23 RX ADMIN — SODIUM CHLORIDE, POTASSIUM CHLORIDE, SODIUM LACTATE AND CALCIUM CHLORIDE 100 ML/HR: 600; 310; 30; 20 INJECTION, SOLUTION INTRAVENOUS at 00:57

## 2022-01-23 RX ADMIN — SODIUM CHLORIDE 500 MG: 9 INJECTION, SOLUTION INTRAVENOUS at 20:14

## 2022-01-23 RX ADMIN — AMPICILLIN AND SULBACTAM 3 G: 1; 2 INJECTION, POWDER, FOR SOLUTION INTRAMUSCULAR; INTRAVENOUS at 06:14

## 2022-01-23 RX ADMIN — PROPOFOL 40 MCG/KG/MIN: 10 INJECTION, EMULSION INTRAVENOUS at 07:07

## 2022-01-23 RX ADMIN — SODIUM CHLORIDE, PRESERVATIVE FREE 10 ML: 5 INJECTION INTRAVENOUS at 14:59

## 2022-01-23 NOTE — PROGRESS NOTES
Shift report received from Shiloh Colon, [facility] using [employer]

## 2022-01-23 NOTE — H&P
[facility]    ICU TEAM Progress Note    Name: Xuan Raymond   : 1990   MRN: [id number]   Date: 2022      Assessment/Plan of care:     Acute hypoxic respiratory failure, related to aspiration of foreign body (food)  - Intubated placed on mechanical ventilation with plan for bronchoscopy upon arrival to the ICU  - Wean FiO2 to maintain SPO2 greater than 92%. - DuoNebs as needed  -Sedation is required with propofol. --check ABG   Aspiration with suspected pneumonitis and/or pneumonia  - Empiric antibiotics with Unasyn  - Will check blood cultures and procalcitonin  - Repeat chest x-ray post intubation and bronchoscopy  Cerebral palsy, stable, with associated seizure, developmental, and behavioral disorders as well as likely dysphagia  - Continue Depakote, will change to IV for now. Check level in a.m.  - Currently sedated, consider resuming Risperdal once extubated with patient on fairly significant dosing. Will Check EKG for QTC assessment. -SLP to eval and treat once extubated. Asthma, not exacerbation  - DuoNebs as needed as above.     Cardiac Gtts: None  SBP Goal of: > 90 mmHg  MAP Goal of: > 65 mmHg  Transfusion Trigger (Hgb): <7 g/dL    Respiratory Goals: Head of bed > 30 degrees  Optimize PEEP/Ventilation/Oxygenation  Aim for lung protective ventilation  Maintain Vt 4-8 cc/kg IBW  Aggressive bronchopulmonary hygiene  SPO2 Goal: > 92%  Pulmonary toilet: Duo-Nebs   DVT Prophylaxis (if no, list reason): SCD's or Sequential Compression Device and Lovenox     GI Prophylaxis: Pepcid (famotidine)   Nutrition: Pending   IVFs: LR  Bowel Movement: Pending  Bowel Regimen: MiraLax    Valentino Catheter Present: Yes  Glycemic Control - Insulin: N/A  Antibiotics:Unasyn    Pain Medications: Fentanyl  Target RASS: -2 - Light Sedation - Briefly awakens to voice (eyes open & contact <10 sec)  Sedation Medications: Propofol  Mobility: Bedrest  PT/OT: Speech therapy consulted and on board   Restraints: Soft wrist restraints  Discussed Plan of Care/Code Status: Full Code    T/L/D  Tubes: ETT and Orogastric Tube  Lines: Peripheral IV  Drains: Valentino Catheter    History of present illness reason for ICU Admission: 16-year-old -American female with history of cerebral palsy, seizure disorder, developmental delay, and behavioral disturbance presents to the ED with reports of suspected aspiration of food/foreign body into the airway during a meal earlier in the day she was observed choking on a hamburger. She has been in her usual state of health prior to this event. Reportedly, the father attempted a finger sweep and the Heimlich maneuver with little improvement. Information obtained primarily from staff, records, and family as the patient is unable provide much meaningful history. In the ED, the patient was hypoxic with SPO2 in the 80s on room air with some remittent but nonsustained improvement into the 90s with NT suctioning and supplemental oxygen. She was also noted to be tachycardic with heart rate as high as 150s. The patient was significantly anxious and distraught in the ED laboratory evaluation was initially unavailable. IV access was eventually obtained the patient was sedated and intubated with plan for bronchoscopy. Critical care consultation was obtained and the patient was admitted to ICU for further evaluation and treatment.       Active Problem List:     Problem List  Date Reviewed: 10/7/2021            Codes Class    Aspiration into airway ICD-10-CM: T17.908A  ICD-9-CM: 934.9         Functional urinary incontinence ICD-10-CM: R39.81  ICD-9-CM: 788.91         Cerebral palsy (HCC) ICD-10-CM: G80.9  ICD-9-CM: 343.9         Mental developmental delay ICD-10-CM: F81.9  ICD-9-CM: 315.9         History of seizures ICD-10-CM: Z87.898  ICD-9-CM: V12.49                 Past Medical History:      has a past medical history of Cerebral palsy ([facility] [address] History of seizures, and Mental developmental delay. Past Surgical History:      has a past surgical history that includes hx orthopaedic () and hx heent (13 yrs old). Home Medications:     Prior to Admission medications    Medication Sig Start Date End Date Taking? Authorizing Provider   divalproex (DEPAKOTE SPRINKLE) 125 mg capsule TAKE 4 CAPSULES BY MOUTH TWICE DAILY. 21   Ingrid Majano MD   abobotulinumtoxinA (Dysport) 500 unit solr 500 Units by IntraMUSCular route every three (3) months. emg guided injections into lower extremities for spasticity 10/22/21   Rex Chauhan,    onabotulinumtoxinA (BOTOX) 100 unit injection 100 Units by IntraMUSCular route every three (3) months. emg guided botox to bilateral legs due to cerebral palsy. G80.1 10/21/21   Rex Chauhan DO   risperiDONE (RISPERDAL) 3 mg tablet Take 3 mg by mouth four (4) times daily. Provider, Historical       Allergies/Social/Family History:     No Known Allergies   Social History     Tobacco Use    Smoking status: Never Smoker    Smokeless tobacco: Never Used   Substance Use Topics    Alcohol use: Not on file      Family History   Problem Relation Age of Onset    No Known Problems Mother     No Known Problems Father        Review of Systems:     Unable to obtain related to patient condition. Objective:   Vital Signs:  Visit Vitals  /87   Pulse (!) 124   Temp 97.7 °F (36.5 °C)   Resp 22   Ht 5' 2\" (1.575 m)   Wt 49.2 kg (108 lb 7.5 oz)   SpO2 100%   BMI 19.84 kg/m²        Temp (24hrs), Av.8 °F (36.6 °C), Min:97.7 °F (36.5 °C), Max:97.8 °F (36.6 °C)           Intake/Output:   No intake or output data in the 24 hours ending 22 0154    Physical Exam:    General:  Sedated and on the ventilator. No acute distress. Eyes:  Sclera anicteric. Pupils equal, round, reactive to light. Mouth/Throat: Orotracheal tube in place. Significant drooling noted. Neck: Supple. Lungs:    Rhonchi noted greater on the left. No wheezing or crackles. Cardiovascular:  Regular rate and rhythm, no murmur, click, rub, or gallop. Abdomen:   Soft, non-tender, bowel sounds normal, non-distended. Extremities: No cyanosis or edema. Skin: No acute rash or lesions. Lymph Nodes: Cervical and supraclavicular normal.   Musculoskeletal:  No swelling or deformity. Lines/Devices:  Intact, multiple residual food debris noted in ET tube   Neuro/psychiatric: Sedated and appears comfortable on ventilator. Was significantly restless prior to intubation. LABS AND  DATA: Personally reviewed  No results for input(s): WBC, HGB, HCT, PLT, HGBEXT, HCTEXT, PLTEXT in the last 72 hours. No results for input(s): NA, K, CL, CO2, BUN, CREA, GLU, CA, MG, PHOS in the last 72 hours. No results for input(s): AP, TBIL, TP, ALB, GLOB, AML, LPSE in the last 72 hours. No lab exists for component: SGOT, GPT, AMYP  No results for input(s): INR, PTP, APTT, INREXT in the last 72 hours. Recent Labs     01/23/22  0001   PHI 7.30*   PCO2I 42.3   PO2I 322*   FIO2I 100     No results for input(s): CPK, CKMB, TROIQ, BNPP in the last 72 hours. Hemodynamics:   PAP:   CO:     Wedge:   CI:     CVP:    SVR:       PVR:       Ventilator Settings:  Mode Rate Tidal Volume Pressure FiO2 PEEP   VC+   350 ml    100 % 10 cm H20     Peak airway pressure: 28 cm H2O    Minute ventilation: 6.8 l/min        MEDS: Reviewed    Chest X-Ray:  CXR Results  (Last 48 hours)                 01/23/22 0120  XR CHEST PORT Final result    Impression:      1. ET tube as described. 2. Atelectasis or developing infiltrate slightly more prominent in the left lung   base. .  . Narrative:  INDICATION:  post intubation and bronch, status post choking on hamburger at   home. EXAM: Chest single view. COMPARISON: Chest x-ray earlier same day.        FINDINGS: A single frontal view of the chest at 0113 hours shows stable   elevation of the left hemidiaphragm with stable to increasing left basilar atelectasis or developing infiltrate. An endotracheal tube is been introduced,   with its tip satisfactory at approximately 3.4 cm above the сергей. .  The heart,   mediastinum and pulmonary vasculature are stable . The bony thorax is   unremarkable for age. Jennifer Man 01/22/22 2248  XR CHEST PORT Final result    Impression:  Poor inspiratory effort. Minimal bibasilar atelectasis left greater than right. .    . Narrative:  INDICATION:  choking        EXAM: Chest single view. COMPARISON: None. Jennifer Man FINDINGS: A single frontal view of the chest at 2242 hours shows poor   inspiratory effort with diffuse interstitial prominence and bibasilar   atelectasis left greater than right. Mild elevation of the left hemidiaphragm. The heart, mediastinum and pulmonary vasculature are stable . The bony thorax   is unremarkable for age. .                   Multidisciplinary Rounds Completed:  Pending    ABCDEF Bundle/Checklist Completed:  Yes    SPECIAL EQUIPMENT  None    DISPOSITION  Stay in ICU    CRITICAL CARE CONSULTANT NOTE  I had a face to face encounter with the patient, reviewed and interpreted patient data including clinical events, labs, images, vital signs, I/O's, and examined patient. I have discussed the case and the plan and management of the patient's care with the consulting services, the bedside nurses and the respiratory therapist.      NOTE OF PERSONAL INVOLVEMENT IN CARE   This patient has a high probability of imminent, clinically significant deterioration, which requires the highest level of preparedness to intervene urgently. I participated in the decision-making and personally managed or directed the management of the following life and organ supporting interventions that required my frequent assessment to treat or prevent imminent deterioration. I personally spent 50 minutes of critical care time.   This is time spent at this critically ill patient's bedside actively involved in patient care as well as the coordination of care and discussions with the patient's family. This does not include any procedural time which has been billed separately.

## 2022-01-23 NOTE — PROGRESS NOTES
01/23/22 1057   Weaning Parameters   Spontaneous Breathing Trial Complete Yes   Resp Rate Observed 17   Ve 4.8      RSBI 58   currently on SBT 5/5 30%

## 2022-01-23 NOTE — ED PROVIDER NOTES
HPI     36Year old female with seizures, MRCP presents from home after a choking episode on a hamburger. Dad did Heimlich and finger sweep without change. Mom states patient was in her usual state of health prior to symptoms- has been vaccinated. Past Medical History:   Diagnosis Date    Cerebral palsy ([address]     History of seizures     Mental developmental delay        Past Surgical History:   Procedure Laterality Date    HX HEENT  13 yrs old    Tonsillectomy and Adenoidectomy    HX ORTHOPAEDIC  2002    Feet w/ Skin Grafts         Family History:   Problem Relation Age of Onset    No Known Problems Mother     No Known Problems Father        Social History     Socioeconomic History    Marital status: SINGLE     Spouse name: Not on file    Number of children: Not on file    Years of education: Not on file    Highest education level: Not on file   Occupational History    Not on file   Tobacco Use    Smoking status: Never Smoker    Smokeless tobacco: Never Used   Substance and Sexual Activity    Alcohol use: Not on file    Drug use: Not on file    Sexual activity: Not on file   Other Topics Concern    Not on file   Social History Narrative    ** Merged History Encounter **          Social Determinants of Health     Financial Resource Strain:     Difficulty of Paying Living Expenses: Not on file   Food Insecurity:     Worried About Running Out of Food in the Last Year: Not on file    Art of Food in the Last Year: Not on file   Transportation Needs:     Lack of Transportation (Medical): Not on file    Lack of Transportation (Non-Medical):  Not on file   Physical Activity:     Days of Exercise per Week: Not on file    Minutes of Exercise per Session: Not on file   Stress:     Feeling of Stress : Not on file   Social Connections:     Frequency of Communication with Friends and Family: Not on file    Frequency of Social Gatherings with Friends and Family: Not on file    Attends Restoration Services: Not on file    Active Member of Clubs or Organizations: Not on file    Attends Club or Organization Meetings: Not on file    Marital Status: Not on file   Intimate Partner Violence:     Fear of Current or Ex-Partner: Not on file    Emotionally Abused: Not on file    Physically Abused: Not on file    Sexually Abused: Not on file   Housing Stability:     Unable to Pay for Housing in the Last Year: Not on file    Number of [place] in the Last Year: Not on file    Unstable Housing in the Last Year: Not on file         ALLERGIES: Patient has no known allergies. Review of Systems   Unable to perform ROS: Patient nonverbal   Constitutional: Negative for fever. HENT: Negative for congestion. Eyes: Negative for visual disturbance. Respiratory: Positive for cough and shortness of breath. Vitals:    01/22/22 2215 01/22/22 2218   BP:  (!) 130/93   Pulse:  (!) 137   Resp:  28   Temp:  97.8 °F (36.6 °C)   SpO2:  (!) 87%   Weight: 59 kg (130 lb)    Height: 5' 2\" (1.575 m)             Physical Exam  Constitutional:       General: She is not in acute distress. Appearance: She is well-developed. HENT:      Head: Normocephalic and atraumatic. Mouth/Throat:      Pharynx: No oropharyngeal exudate. Eyes:      General: No scleral icterus. Right eye: No discharge. Left eye: No discharge. Pupils: Pupils are equal, round, and reactive to light. Neck:      Vascular: No JVD. Cardiovascular:      Rate and Rhythm: Regular rhythm. Tachycardia present. Heart sounds: Normal heart sounds. No murmur heard. Pulmonary:      Effort: Pulmonary effort is normal. No respiratory distress. Breath sounds: Normal breath sounds. No stridor. No wheezing or rales. Comments: Gurgling breath sounds  No stridor  Chest:      Chest wall: No tenderness. Abdominal:      General: Bowel sounds are normal. There is no distension. Palpations: Abdomen is soft.  There is no mass. Tenderness: There is no abdominal tenderness. There is no guarding or rebound. Musculoskeletal:         General: Normal range of motion. Cervical back: Normal range of motion and neck supple. Skin:     General: Skin is warm and dry. Capillary Refill: Capillary refill takes less than 2 seconds. Findings: No rash. Neurological:      Mental Status: She is oriented to person, place, and time. Psychiatric:         Behavior: Behavior normal.         Thought Content: Thought content normal.         Judgment: Judgment normal.          [facility]     45 minutes of critical care time  Intubation    Date/Time: 1/22/2022 11:20 PM  Performed by: Radha Cantu MD  Authorized by: Radha Cantu MD     Consent:     Consent obtained:  Emergent situation    Consent given by:  Parent    Risks discussed:  Aspiration, laryngeal injury, hypoxia and death    Alternatives discussed:  No treatment  Pre-procedure details:     Patient status:  Altered mental status    Mallampati score:  II    Pretreatment meds: ativan. Paralytics:  Succinylcholine  Procedure details:     Preoxygenation:  Nonrebreather mask    CPR in progress: no      Intubation method:  Oral    Oral intubation technique:  Video-assisted    Laryngoscope blade: Mac 3    Tube size (mm):  8.0    Tube type:  Cuffed    Number of attempts:  1    Cricoid pressure: no      Tube visualized through cords: yes    Placement assessment:     ETT to lip:  23    Tube secured with:  ETT mcarthur    Breath sounds:  Equal    Placement verification: chest rise    Post-procedure details:     Patient tolerance of procedure: Tolerated well, no immediate complications          Deep suction through nose with some clearance, remains hypoxic. Diminished breath sounds on left. Spoke with ICU- Dr. Kendrick Newman will come down- likely needs scope. ICU at bedside. Will intubate and they will scope up in ICU.

## 2022-01-23 NOTE — ED NOTES
Patient unable to maintain adequate oxygenation. Deidra Padilla ICU MD and Tani Jacome ED MD at bedside to perform Intubation to for planned bronchoscopy. Verbal consent received from mother - verbalizes understanding for procedure and sedation. Emergency equipment at bedside, patient hooked up to bedside monitor. Succs and etomidate given. 7.5 ET tube used. Airway obtained on first attempt, confirmed via color change. Preprocedure vitals: /85  RR 30 88% on 15 L nonrebreather. Pt tolerated procedure well. Deep suction by RT to remove obstruction/ hamburger. Pt transported to ICU for continued care and bronch.

## 2022-01-23 NOTE — PROCEDURES
[facility]  Bronchoscopy    Procedure: Therapeutic bronchoscopy. Indication: large volume aspiration with food particles    Consent/Treatment: Informed consent was obtained from the  family after risks, benefits and alternatives were explained. Timeout verified the correct patient and correct procedure. Anesthesia:   Patient on ventilator and receiving  Propofol drip infusion with boluses of fentanyl     Prior to the procedure, significant amounts of larger particulate matter (consistent with the reported hamburger) were suctioned from the endotracheal tube. The following parameters were monitored: oxygen saturation, heart rate, blood pressure, respiratory rate, EKG, adequacy of pulmonary ventilation and response to care. Total physician intraservice time was 30 min      Procedure Details:   -- The bronchoscope was introduced through an endotracheal tube. -- The endotracheal tube was noted to be in the right mainstem to the bronchus intermedius. The endotracheal tube was withdrawn from 24cm at the teeth to 19cm, which was then measured bronchoscopically at 4cm proximal to the сергей  -- The trachea and сергей were completely inspected and were found to be normal.  -- The right and left-sided endobronchial anatomy was completely inspected and notable for signs of minor trauma to the endobronchial lining of the right BI and a fully occluding mucous plug in the medial segment of the right middle lobe, which was successfully aspirated by suction. The remainder of the right upper, middle, and lower lobes along with the segments of the left upper, lingula and lower were noted to be diffusely erythematous but without remaining identifiable aspirate.      Complications: none    Jarad Myers MD

## 2022-01-23 NOTE — ED TRIAGE NOTES
Pt arrives via EMS after choking on hamburger at dinner. Per EMS father attempted hymlick and finger sweep both of which unsuccessful. Suctioned in route - yellow sputum and some blood tinged sputum noted. Pt audibly gurgling on arrival.      Pt nonverbal at baseline.     Hx: asthma, cerebral palsy, sz

## 2022-01-23 NOTE — PROGRESS NOTES
Orders received for transfer out of ICU, ordering provider, Jenna Pace MD notified of patient's lethargy post extubation, which is not patient's baseline per family, MD TANIA okay with patient transferring out of ICU.

## 2022-01-23 NOTE — PROGRESS NOTES
Transition of Care Plan  1. RUR- 9%  2. DISPOSITION: TBD/subject to change pending recommendations; pending medical progression   -Anticipate home with family assistance once medically stable. 3. F/U with PCP/Specialist    4. Transport: Family    CM will continue to follow and assist with ORTEGA needs as they arise. Reason for Admission:  Aspiration into airway                     RUR Score:   9%                  Plan for utilizing home health:  TBD/subject to change pending recommendations. PCP: YES First and Last name:  Reddy Lucio MD     Name of Practice: [address] [place] [address]   Are you a current patient: Yes/No: YES   Approximate date of last visit: 6/15/21   Can you participate in a virtual visit with your PCP: YES, with family assistance and support. Current Advanced Directive/Advance Care Plan: Full Code      Healthcare Decision Maker:   Parents: Kei Arrieta [phone] and Michael Huerta [phone]           Initial assessment completed with patient's mother, Michael Huerta [phone].    36year old female, non-verbal at baseline. Medical history: cerebral palsy, seizure disorder, developmental delay, and behavioral disturbance. Patient resides with her parents in their home. Patient is total care. Parents are primary CG's and patient's siblings assist with care. DME utilized in the home consist of W/C and [employer] [facility]. Family currently working to obtain chair lift in the home. Patient receives disability as source of income. Insurance verified: [employer]. Care Management Interventions  PCP Verified by CM: Yes  Last Visit to PCP: 06/15/21  Palliative Care Criteria Met (RRAT>21 & CHF Dx)?: No  Mode of Transport at Discharge:  Other (see comment) (Family)  Transition of Care Consult (CM Consult):  (No current CM consult)  Discharge Durable Medical Equipment: No  Physical Therapy Consult: No  Occupational Therapy Consult: No  Speech Therapy Consult: No  Support Systems: Parent(s),Other Family Member(s)  Confirm Follow Up Transport: Family  Discharge Location  Patient Expects to be Discharged to[de-identified] Home with family assistance (TBD/subject to change pending recommendations)    BLAIR Duong/ELIAS  Care Management  12:04 PM

## 2022-01-23 NOTE — PROGRESS NOTES
0000: Bedside and Verbal shift change report given to [address] (oncoming nurse) by Swapnil Washburn RN (offgoing nurse). Report included the following information SBAR, Kardex, ED Summary, Intake/Output, MAR, Recent Results and Cardiac Rhythm ST. Primary Nurse Elizabeth Nunez, BEHZAD and Rita, RN performed a dual skin assessment on this patient No impairment noted  George score is 10      0025: MD, NP, RN, and RT at bedside preparing for bronchoscopy. Timeout completed by Dr. Yisel Strauss. Bronchoscopy began. 2899: 50 mcg fentanyl administered per Dr. Yisel Strauss. 0030: ETT retracted to 19 @ lip per Dr. Yisel Strauss. 0037: Remaining 50 mcg fentanyl administered per Dr. Yisel Strauss. 2054: Procedure complete. 0230: Lab called to report that specimens appear contaminated. Re-draw ordered. 0730: Bedside and Verbal shift change report given to Bert WEN (oncoming nurse) by Gwendolyn Craig RN (offgoing nurse). Report included the following information SBAR, Kardex, ED Summary, Procedure Summary, Intake/Output, MAR, Recent Results, Cardiac Rhythm ST and Alarm Parameters .

## 2022-01-23 NOTE — PROGRESS NOTES
[facility] Adult  Hospitalist Group     ICU Transfer/Accept Summary     [place] [address] [place]  DATE OF TRANSFER: 1/23/2022       PATIENT ID: Callum Bonilla  MRN: [id number]   YOB: 1990    PRIMARY CARE PROVIDER: Cha Bess MD   DATE OF ADMISSION: 1/22/2022 10:06 PM    ATTENDING PHYSICIAN: REJI Perla  CONSULTATIONS:   None    PROCEDURES/SURGERIES:   * No surgery found *    REASON FOR ADMISSION: <principal problem not specified>     HOSPITAL PROBLEM LIST:  Patient Active Problem List   Diagnosis Code    Cerebral palsy ([facility] [address] G80.9    Mental developmental delay F81.9    History of seizures Z87.898    Functional urinary incontinence R39.81    Aspiration into airway T17.908A         Brief HPI and Hospital Course:    Per H&P \" 61-year-old -American female with history of cerebral palsy, seizure disorder, developmental delay, and behavioral disturbance presents to the ED with reports of suspected aspiration of food/foreign body into the airway during a meal earlier in the day she was observed choking on a hamburger. She has been in her usual state of health prior to this event. Reportedly, the father attempted a finger sweep and the Heimlich maneuver with little improvement. Information obtained primarily from staff, records, and family as the patient is unable provide much meaningful history. In the ED, the patient was hypoxic with SPO2 in the 80s on room air with some remittent but nonsustained improvement into the 90s with NT suctioning and supplemental oxygen. She was also noted to be tachycardic with heart rate as high as 150s. The patient was significantly anxious and distraught in the ED laboratory evaluation was initially unavailable. IV access was eventually obtained the patient was sedated and intubated with plan for bronchoscopy.   Critical care consultation was obtained and the patient was admitted to ICU for further evaluation and treatment. \"    Assessment and Plan:    # Acute hypoxic respiratory failure  # Acute aspiration of foreign body  #Aspiration pneumonitis vs pneumonia    - s/p intubation with bronch and removal of food objects, extubated 1/23/22    - Infiltrations noted on repeat CXR, monitor patient received Ancef and zosyn while in ICU    - Will continue zosyn for now and monitor     - continue pulmonary toileting   # H/o cerebral palsy     - Continue depakote     - Resume risperdal once cleared by SLP     - SLP pending  # H/o Asthma     - Continue prn duo nebs   # Leukocytosis     - possibly reactive, will continue ABX for now. # Tachycardia     - Consider po metoprolol once cleared by SLP           PHYSICAL EXAMINATION:  Visit Vitals  /81   Pulse (!) 126   Temp 98.3 °F (36.8 °C)   Resp 21   Ht 5' 2\" (1.575 m)   Wt 49.2 kg (108 lb 7.5 oz)   SpO2 95%   BMI 19.84 kg/m²       General:          Alert, cooperative, no distress  HEENT:           Atraumatic, MMM            Neck:               Supple, symmetrical,  thyroid: non tender  Lungs:             Clear to auscultation bilaterally. No Wheezing or Rhonchi. No rales. Heart:              Regular  rhythm,  No  murmur   No edema  Abdomen:       Soft, non-tender. Not distended. Bowel sounds normal  Extremities:     No cyanosis. No clubbing,  +2 distal pulses  Skin:                Not pale. Not Jaundiced  No rashes   Psych:             Not anxious or agitated.   Neurologic:      Alert, moves all extremities    Labs:     Recent Labs     01/23/22  0242 01/23/22  0119   WBC 17.5* PLEASE DISREGARD RESULTS   HGB 13.7 PLEASE DISREGARD RESULTS   HCT 40.8 PLEASE DISREGARD RESULTS    PLEASE DISREGARD RESULTS     Recent Labs     01/23/22  0242      K 3.8   *   CO2 21   BUN 16   CREA 0.65   *   CA 8.7     Recent Labs     01/23/22  0242   ALT 14   AP 98   TBILI 0.4   TP 7.4   ALB 3.4*   GLOB 4.0     No results for input(s): INR, PTP, APTT, INREXT in the last 72 hours. No results for input(s): FE, TIBC, PSAT, FERR in the last 72 hours. No results found for: FOL, RBCF   Recent Labs     01/23/22  0320   PH 7.31*   PCO2 40   PO2 144*     No results for input(s): CPK, CKNDX, TROIQ in the last 72 hours.     No lab exists for component: CPKMB  No results found for: CHOL, CHOLX, CHLST, CHOLV, HDL, HDLP, LDL, LDLC, DLDLP, TGLX, TRIGL, TRIGP, CHHD, CHHDX  No results found for: [facility]  Lab Results   Component Value Date/Time    Color YELLOW/STRAW 01/23/2022 01:19 AM    Appearance CLOUDY (A) 01/23/2022 01:19 AM    Specific gravity 1.033 01/23/2022 01:19 AM    pH (UA) 5.5 01/23/2022 01:19 AM    Protein TRACE (A) 01/23/2022 01:19 AM    Glucose Negative 01/23/2022 01:19 AM    Ketone 15 (A) 01/23/2022 01:19 AM    Bilirubin Negative 01/23/2022 01:19 AM    Urobilinogen 1.0 01/23/2022 01:19 AM    Nitrites Negative 01/23/2022 01:19 AM    Leukocyte Esterase Negative 01/23/2022 01:19 AM    Epithelial cells FEW 01/23/2022 01:19 AM    Bacteria Negative 01/23/2022 01:19 AM    WBC 0-4 01/23/2022 01:19 AM    RBC 0-5 01/23/2022 01:19 AM         CODE STATUS:  X Full Code    DNR    Partial    Comfort Care       Signed:   REJI Ferrell  Date of Service:  1/23/2022  3:55 PM

## 2022-01-23 NOTE — H&P
[facility]    ICU TEAM Progress Note    Name: Xuan Raymond   : 1990   MRN: [id number]   Date: 2022      Assessment/Plan of care:     Acute hypoxic respiratory failure  Acute aspiration of foreign body  Likely aspiration pneumonitis  - s/p intubation with bronch and removal of food objects  - plan for SAT/SBT today  - no indication for abx -- stop    H/o cerebral palsy, stable, with associated seizure, developmental, and behavioral disorders as well as likely dysphagia  - cont IV depakote  - restart home risperdal once extubated   - SLP to eval and treat once extubated    H/o asthma, without exac  - DuoNebs as needed     Leukocytosis  - likely reactive  - monitor for now      GI Prophylaxis: Pepcid (famotidine)   Nutrition: Pending   IVFs: none  Bowel Movement: Pending  Bowel Regimen: MiraLax    Valentino Catheter Present: Yes  Glycemic Control - Insulin: N/A  Antibiotics: none    T/L/D  Tubes: ETT and Orogastric Tube  Lines: Peripheral IV  Drains: Valentino Catheter    History of present illness reason for ICU Admission: Per prior clinician: 68-year-old -American female with history of cerebral palsy, seizure disorder, developmental delay, and behavioral disturbance presents to the ED with reports of suspected aspiration of food/foreign body into the airway during a meal earlier in the day she was observed choking on a hamburger. She has been in her usual state of health prior to this event. Reportedly, the father attempted a finger sweep and the Heimlich maneuver with little improvement. Information obtained primarily from staff, records, and family as the patient is unable provide much meaningful history. In the ED, the patient was hypoxic with SPO2 in the 80s on room air with some remittent but nonsustained improvement into the 90s with NT suctioning and supplemental oxygen. She was also noted to be tachycardic with heart rate as high as 150s.   The patient was significantly anxious and distraught in the ED laboratory evaluation was initially unavailable. IV access was eventually obtained the patient was sedated and intubated with plan for bronchoscopy. Critical care consultation was obtained and the patient was admitted to ICU for further evaluation and treatment. Overnight events:  1/23: intermittent agitation req'g Ativan pushes      Active Problem List:     Problem List  Date Reviewed: 10/7/2021          Codes Class    Aspiration into airway ICD-10-CM: T17.908A  ICD-9-CM: 934.9         Functional urinary incontinence ICD-10-CM: R39.81  ICD-9-CM: 788.91         Cerebral palsy (HCC) ICD-10-CM: G80.9  ICD-9-CM: 343.9         Mental developmental delay ICD-10-CM: F81.9  ICD-9-CM: 315.9         History of seizures ICD-10-CM: Z87.898  ICD-9-CM: V12.49               Past Medical History:      has a past medical history of Cerebral palsy ([facility] [address] History of seizures, and Mental developmental delay. Past Surgical History:      has a past surgical history that includes hx orthopaedic (2002) and hx heent (13 yrs old). Home Medications:     Prior to Admission medications    Medication Sig Start Date End Date Taking? Authorizing Provider   divalproex (DEPAKOTE SPRINKLE) 125 mg capsule TAKE 4 CAPSULES BY MOUTH TWICE DAILY. 12/30/21   Bozena Tejeda MD   abobotulinumtoxinA (Dysport) 500 unit solr 500 Units by IntraMUSCular route every three (3) months. emg guided injections into lower extremities for spasticity 10/22/21   Rex Chauhan DO   onabotulinumtoxinA (BOTOX) 100 unit injection 100 Units by IntraMUSCular route every three (3) months. emg guided botox to bilateral legs due to cerebral palsy. G80.1 10/21/21   Rex Chauhan DO   risperiDONE (RISPERDAL) 3 mg tablet Take 3 mg by mouth four (4) times daily.     Provider, Historical       Allergies/Social/Family History:     No Known Allergies   Social History     Tobacco Use    Smoking status: Never Smoker    Smokeless tobacco: Never Used   Substance Use Topics    Alcohol use: Not on file      Family History   Problem Relation Age of Onset    No Known Problems Mother     No Known Problems Father          Objective:   Vital Signs:  Visit Vitals  /83   Pulse (!) 117   Temp 99.6 °F (37.6 °C)   Resp 22   Ht 5' 2\" (1.575 m)   Wt 49.2 kg (108 lb 7.5 oz)   SpO2 100%   BMI 19.84 kg/m²      O2 Device: Endotracheal tube,Ventilator Temp (24hrs), Av.4 °F (36.9 °C), Min:97.7 °F (36.5 °C), Max:99.6 °F (37.6 °C)           Intake/Output:     Intake/Output Summary (Last 24 hours) at 2022 0704  Last data filed at 2022 0600  Gross per 24 hour   Intake 460.26 ml   Output 155 ml   Net 305.26 ml       Physical Exam:  WDWN female, appearing stated age, thin   Intubated, sedated  Resps even and unlabored, sync with MV  HR slightly tachy, no heave/rub  Exts dry, warm  No visible rashes        LABS AND  DATA: Personally reviewed  Recent Labs     22  0242 22  0119   WBC 17.5* PLEASE DISREGARD RESULTS   HGB 13.7 PLEASE DISREGARD RESULTS   HCT 40.8 PLEASE DISREGARD RESULTS    PLEASE DISREGARD RESULTS     Recent Labs     22  0242      K 3.8   *   CO2 21   BUN 16   CREA 0.65   *   CA 8.7     Recent Labs     22  0242   AP 98   TP 7.4   ALB 3.4*   GLOB 4.0     No results for input(s): INR, PTP, APTT, INREXT, INREXT in the last 72 hours. Recent Labs     22  0001   PHI 7.30*   PCO2I 42.3   PO2I 322*   FIO2I 100     No results for input(s): CPK, CKMB, TROIQ, BNPP in the last 72 hours. Hemodynamics:   PAP:   CO:     Wedge:   CI:     CVP:    SVR:       PVR:       Ventilator Settings:  Mode Rate Tidal Volume Pressure FiO2 PEEP   Assist control,Volume control   350 ml    30 % 5 cm H20     Peak airway pressure: 21 cm H2O    Minute ventilation: 7.09 l/min        MEDS: Reviewed    Chest X-Ray:  CXR Results  (Last 48 hours)               22 0120  XR CHEST PORT Final result    Impression:      1.  ET tube as described. 2. Atelectasis or developing infiltrate slightly more prominent in the left lung   base. .  . Narrative:  INDICATION:  post intubation and bronch, status post choking on hamburger at   home. EXAM: Chest single view. COMPARISON: Chest x-ray earlier same day. FINDINGS: A single frontal view of the chest at 0113 hours shows stable   elevation of the left hemidiaphragm with stable to increasing left basilar   atelectasis or developing infiltrate. An endotracheal tube is been introduced,   with its tip satisfactory at approximately 3.4 cm above the сергей. .  The heart,   mediastinum and pulmonary vasculature are stable . The bony thorax is   unremarkable for age. Brenda Mg 01/22/22 2248  XR CHEST PORT Final result    Impression:  Poor inspiratory effort. Minimal bibasilar atelectasis left greater than right. .    . Narrative:  INDICATION:  choking        EXAM: Chest single view. COMPARISON: None. Brenda Mg FINDINGS: A single frontal view of the chest at 2242 hours shows poor   inspiratory effort with diffuse interstitial prominence and bibasilar   atelectasis left greater than right. Mild elevation of the left hemidiaphragm. The heart, mediastinum and pulmonary vasculature are stable . The bony thorax   is unremarkable for age. .                 Multidisciplinary Rounds Completed:  No    ABCDEF Bundle/Checklist Completed:  Yes    SPECIAL EQUIPMENT  None    DISPOSITION  Stay in ICU; likely transfer out soon if extubation successful  Admitting NP on hospitalist team contacted at [phone] via secure messaging. CRITICAL CARE CONSULTANT NOTE  I had a face to face encounter with the patient, reviewed and interpreted patient data including clinical events, labs, images, vital signs, I/O's, and examined patient.   I have discussed the case and the plan and management of the patient's care with the consulting services, the bedside nurses and the respiratory therapist.      NOTE OF PERSONAL INVOLVEMENT IN CARE   This patient has a high probability of imminent, clinically significant deterioration, which requires the highest level of preparedness to intervene urgently. I participated in the decision-making and personally managed or directed the management of the following life and organ supporting interventions that required my frequent assessment to treat or prevent imminent deterioration. I personally spent 30 minutes of critical care time. This is time spent at this critically ill patient's bedside actively involved in patient care as well as the coordination of care and discussions with the patient's family. This does not include any procedural time which has been billed separately.       Gini Ray MD  Intensivist  7:09 AM

## 2022-01-24 ENCOUNTER — APPOINTMENT (OUTPATIENT)
Dept: GENERAL RADIOLOGY | Age: 32
DRG: 208 | End: 2022-01-24
Attending: STUDENT IN AN ORGANIZED HEALTH CARE EDUCATION/TRAINING PROGRAM
Payer: COMMERCIAL

## 2022-01-24 LAB
ANION GAP SERPL CALC-SCNC: 12 MMOL/L (ref 5–15)
BUN SERPL-MCNC: 12 MG/DL (ref 6–20)
BUN/CREAT SERPL: 12 (ref 12–20)
CALCIUM SERPL-MCNC: 9.2 MG/DL (ref 8.5–10.1)
CHLORIDE SERPL-SCNC: 106 MMOL/L (ref 97–108)
CO2 SERPL-SCNC: 23 MMOL/L (ref 21–32)
CREAT SERPL-MCNC: 0.98 MG/DL (ref 0.55–1.02)
ERYTHROCYTE [DISTWIDTH] IN BLOOD BY AUTOMATED COUNT: 14.1 % (ref 11.5–14.5)
GLUCOSE BLD STRIP.AUTO-MCNC: 128 MG/DL (ref 65–117)
GLUCOSE BLD STRIP.AUTO-MCNC: 67 MG/DL (ref 65–117)
GLUCOSE SERPL-MCNC: 53 MG/DL (ref 65–100)
HCT VFR BLD AUTO: 43.9 % (ref 35–47)
HGB BLD-MCNC: 14.1 G/DL (ref 11.5–16)
LACTATE SERPL-SCNC: 1.7 MMOL/L (ref 0.4–2)
MAGNESIUM SERPL-MCNC: 2.2 MG/DL (ref 1.6–2.4)
MCH RBC QN AUTO: 30.4 PG (ref 26–34)
MCHC RBC AUTO-ENTMCNC: 32.1 G/DL (ref 30–36.5)
MCV RBC AUTO: 94.6 FL (ref 80–99)
NRBC # BLD: 0 K/UL (ref 0–0.01)
NRBC BLD-RTO: 0 PER 100 WBC
PLATELET # BLD AUTO: 244 K/UL (ref 150–400)
PMV BLD AUTO: 11.9 FL (ref 8.9–12.9)
POTASSIUM SERPL-SCNC: 3.5 MMOL/L (ref 3.5–5.1)
RBC # BLD AUTO: 4.64 M/UL (ref 3.8–5.2)
SERVICE CMNT-IMP: ABNORMAL
SERVICE CMNT-IMP: NORMAL
SODIUM SERPL-SCNC: 141 MMOL/L (ref 136–145)
WBC # BLD AUTO: 20.3 K/UL (ref 3.6–11)

## 2022-01-24 PROCEDURE — 74011000250 HC RX REV CODE- 250: Performed by: STUDENT IN AN ORGANIZED HEALTH CARE EDUCATION/TRAINING PROGRAM

## 2022-01-24 PROCEDURE — 74011250636 HC RX REV CODE- 250/636: Performed by: NURSE PRACTITIONER

## 2022-01-24 PROCEDURE — 36415 COLL VENOUS BLD VENIPUNCTURE: CPT

## 2022-01-24 PROCEDURE — 74011000258 HC RX REV CODE- 258: Performed by: NURSE PRACTITIONER

## 2022-01-24 PROCEDURE — 80048 BASIC METABOLIC PNL TOTAL CA: CPT

## 2022-01-24 PROCEDURE — 87040 BLOOD CULTURE FOR BACTERIA: CPT

## 2022-01-24 PROCEDURE — 85027 COMPLETE CBC AUTOMATED: CPT

## 2022-01-24 PROCEDURE — 65270000029 HC RM PRIVATE

## 2022-01-24 PROCEDURE — 83735 ASSAY OF MAGNESIUM: CPT

## 2022-01-24 PROCEDURE — 92611 MOTION FLUOROSCOPY/SWALLOW: CPT

## 2022-01-24 PROCEDURE — 74011250637 HC RX REV CODE- 250/637: Performed by: NURSE PRACTITIONER

## 2022-01-24 PROCEDURE — 92610 EVALUATE SWALLOWING FUNCTION: CPT

## 2022-01-24 PROCEDURE — 82962 GLUCOSE BLOOD TEST: CPT

## 2022-01-24 PROCEDURE — 83605 ASSAY OF LACTIC ACID: CPT

## 2022-01-24 PROCEDURE — 74011250636 HC RX REV CODE- 250/636: Performed by: STUDENT IN AN ORGANIZED HEALTH CARE EDUCATION/TRAINING PROGRAM

## 2022-01-24 PROCEDURE — 74011000250 HC RX REV CODE- 250: Performed by: NURSE PRACTITIONER

## 2022-01-24 PROCEDURE — 74230 X-RAY XM SWLNG FUNCJ C+: CPT

## 2022-01-24 RX ORDER — METOPROLOL TARTRATE 25 MG/1
25 TABLET, FILM COATED ORAL EVERY 12 HOURS
Status: DISCONTINUED | OUTPATIENT
Start: 2022-01-24 | End: 2022-01-25 | Stop reason: HOSPADM

## 2022-01-24 RX ORDER — DEXTROSE 50 % IN WATER (D50W) INTRAVENOUS SYRINGE
12.5-25 AS NEEDED
Status: DISCONTINUED | OUTPATIENT
Start: 2022-01-24 | End: 2022-01-25 | Stop reason: HOSPADM

## 2022-01-24 RX ORDER — DEXTROSE AND POTASSIUM CHLORIDE 5; .15 G/100ML; G/100ML
SOLUTION INTRAVENOUS CONTINUOUS
Status: DISCONTINUED | OUTPATIENT
Start: 2022-01-24 | End: 2022-01-25 | Stop reason: HOSPADM

## 2022-01-24 RX ORDER — MAGNESIUM SULFATE 100 %
4 CRYSTALS MISCELLANEOUS AS NEEDED
Status: DISCONTINUED | OUTPATIENT
Start: 2022-01-24 | End: 2022-01-25 | Stop reason: HOSPADM

## 2022-01-24 RX ADMIN — SODIUM CHLORIDE, PRESERVATIVE FREE 20 MG: 5 INJECTION INTRAVENOUS at 17:42

## 2022-01-24 RX ADMIN — SODIUM CHLORIDE, PRESERVATIVE FREE 10 ML: 5 INJECTION INTRAVENOUS at 21:11

## 2022-01-24 RX ADMIN — SODIUM CHLORIDE, POTASSIUM CHLORIDE, SODIUM LACTATE AND CALCIUM CHLORIDE 1000 ML: 600; 310; 30; 20 INJECTION, SOLUTION INTRAVENOUS at 17:00

## 2022-01-24 RX ADMIN — ACETAMINOPHEN 650 MG: 650 SUPPOSITORY RECTAL at 09:14

## 2022-01-24 RX ADMIN — SODIUM CHLORIDE, PRESERVATIVE FREE 20 MG: 5 INJECTION INTRAVENOUS at 09:14

## 2022-01-24 RX ADMIN — PIPERACILLIN AND TAZOBACTAM 3.38 G: 3; .375 INJECTION, POWDER, LYOPHILIZED, FOR SOLUTION INTRAVENOUS at 07:31

## 2022-01-24 RX ADMIN — SODIUM CHLORIDE 500 MG: 9 INJECTION, SOLUTION INTRAVENOUS at 21:11

## 2022-01-24 RX ADMIN — PIPERACILLIN AND TAZOBACTAM 3.38 G: 3; .375 INJECTION, POWDER, LYOPHILIZED, FOR SOLUTION INTRAVENOUS at 17:01

## 2022-01-24 RX ADMIN — DEXTROSE MONOHYDRATE 25 G: 25 INJECTION, SOLUTION INTRAVENOUS at 09:14

## 2022-01-24 RX ADMIN — ENOXAPARIN SODIUM 40 MG: 100 INJECTION SUBCUTANEOUS at 09:14

## 2022-01-24 RX ADMIN — POTASSIUM CHLORIDE AND DEXTROSE MONOHYDRATE: 150; 5 INJECTION, SOLUTION INTRAVENOUS at 13:44

## 2022-01-24 RX ADMIN — SODIUM CHLORIDE 500 MG: 9 INJECTION, SOLUTION INTRAVENOUS at 13:44

## 2022-01-24 NOTE — PROGRESS NOTES
SLP Contact Note    Evaluation complete. Recommend pureed diet/thin liquids and will plan to complete a Modified Barium Swallow Study to objectively assess safety of swallow physiology. Full note to follow.       Thank you,  SARAH BrionesEd, [id number] [facility]  Speech-Language Pathologist

## 2022-01-24 NOTE — PROGRESS NOTES
ORTEGA:  1. RUR-10%  2. Return home with parents providing care when medically stable. 3. Patient to get Barium Swallow test.    CM participated in rounds, and continue to follow for transitional care needs.     Nilsa Bradshaw [facility]

## 2022-01-24 NOTE — PROGRESS NOTES
Problem: Dysphagia (Adult)  Goal: *Acute Goals and Plan of Care (Insert Text)  Description: Speech Therapy Goals  Initiated 1/24/2022    1. Patient will participate in Modified Barium Swallow Study to objectively assess safety of swallow and provide strategies within 3 days. Outcome: Progressing Towards Goal     SPEECH LANGUAGE PATHOLOGY BEDSIDE SWALLOW EVALUATION  Patient: Damaris Roe (30 y.o. female)  Date: 1/24/2022  Primary Diagnosis: Aspiration into airway [T17.908A]        Precautions:        ASSESSMENT :  Based on the objective data described below, the patient presents with risk for aspiration given recent aspiration episode with a hamburger and history of CP. Given these risks and given that it has been some time since her last MBS per her mother, will plan to complete a MBS to objectively assess safety of swallow physiology. In the interim, recommend pureed diet/thin liquids. Patient will benefit from skilled intervention to address the above impairments. Patients rehabilitation potential is considered to be Fair     PLAN :  Recommendations and Planned Interventions:  --Pureed diet/thin liquids  --Pacing to slow down  --upright  --MBS today. Frequency/Duration: Patient will be followed by speech-language pathology 3 times a week to address goals. Discharge Recommendations:  To Be Determined     SUBJECTIVE:   Patient non-verbal.    OBJECTIVE:     Past Medical History:   Diagnosis Date    Cerebral palsy ([facility] [address]     History of seizures     Mental developmental delay      Past Surgical History:   Procedure Laterality Date    HX HEENT  13 yrs old    Tonsillectomy and Adenoidectomy    HX ORTHOPAEDIC  2002    Feet w/ Skin Grafts     Prior Level of Function/Home Situation:   Home Situation  Support Systems: Parent(s),Other Family Member(s)  Patient Expects to be Discharged to[de-identified] Home with family assistance (TBD/subject to change pending recommendations)  Diet prior to admission: regular diet/thin liquids  Current Diet:  NPO   Cognitive and Communication Status:  Neurologic State: Alert  Orientation Level: Unable to verbalize  Cognition: No command following           Oral Assessment:     P.O. Trials:  Patient Position: upright in bed  Vocal quality prior to P.O.:    Consistency Presented: Thin liquid;Puree  How Presented: Straw;Successive swallows     Bolus Acceptance: No impairment  Bolus Formation/Control: No impairment     Propulsion: No impairment  Oral Residue: None  Initiation of Swallow: No impairment  Laryngeal Elevation: Functional  Aspiration Signs/Symptoms: None  Pharyngeal Phase Characteristics: No impairment, issues, or problems   Effective Modifications: None          Oral Phase Severity: No impairment  Pharyngeal Phase Severity : No impairment    NOMS:   The NOMS functional outcome measure was used to quantify this patient's level of swallowing impairment. Based on the NOMS, the patient was determined to be at level 5 for swallow function       NOMS Swallowing Levels:  Level 1 (CN): NPO  Level 2 (CM): NPO but takes consistency in therapy  Level 3 (CL): Takes less than 50% of nutrition p.o. and continues with nonoral feedings; and/or safe with mod cues; and/or max diet restriction  Level 4 (CK): Safe swallow but needs mod cues; and/or mod diet restriction; and/or still requires some nonoral feeding/supplements  Level 5 (CJ): Safe swallow with min diet restriction; and/or needs min cues  Level 6 (CI): Independent with p.o.; rare cues; usually self cues; may need to avoid some foods or needs extra time  Level 7 ([address] [place] [address]): Independent for all p.o.  GERHARD. (2003). National Outcomes Measurement System (NOMS): Adult Speech-Language Pathology User's Guide.        Pain:             After treatment:   Call bell within reach and Nursing notified    COMMUNICATION/EDUCATION:       The patient's plan of care including recommendations, planned interventions, and recommended diet changes were discussed with: Registered nurse. Patient is unable to participate in goal setting and plan of care. Pt's mother participated.     Thank you for this referral.  JAY Pittman  Time Calculation: 10 mins

## 2022-01-24 NOTE — PROGRESS NOTES
Transfer report given to [facility], RN using SBAR for patient transferring to room [address] on [address]. Wendy Horta RN to transfer patient when room is ready.

## 2022-01-24 NOTE — PROGRESS NOTES
Informed by lab of patient having blood sugar of 53. BS 67 after being re-checked. Dr. Garett Obregon paged at [phone].

## 2022-01-24 NOTE — PROGRESS NOTES
Problem: Dysphagia (Adult)  Goal: *Acute Goals and Plan of Care (Insert Text)  Description: Speech Therapy Goals  Initiated 1/24/2022    1. Patient will participate in Modified Barium Swallow Study to objectively assess safety of swallow and provide strategies within 3 days. Outcome: Resolved/Met     SPEECH PATHOLOGY MODIFIED BARIUM SWALLOW STUDY WITH DISCHARGE  Patient: Jonathan Landaverde (30 y.o. female)  Date: 1/24/2022  Primary Diagnosis: Aspiration into airway [T17.908A]        Precautions:       ASSESSMENT :  Based on the objective data described below, the patient presents with mild to moderate oral dysphagia with a functional pharyngeal phase of the swallow. Oral phase characterized by reduced bolus control with rapid posterior propulsion. Patient also rocking back and forth resulting in premature spillage x1 with large bolus in the oral cavity. Patient also with mild diffuse oral residue due to lingual weakness. Pharyngeal phase characterized by timely swallow initiation and adequate laryngeal vestibule closure. No aspiration occurred during the study. Patient with 1x episode of penetration to the cords before the swallow due to premature spillage 2/2 oral deficits. Penetration cleared with the force of the swallow. Patient also with mildly reduced pharyngeal constriction resulting in mild vallecular residue with purees and solids. Residue cleared with spontaneously elicited double swallows. UES WFL. Given results of MBS as described above, suspect that choking episode was due to oral discoordination rather than due to a pharyngeal deficits. Therefore, recommend easy to chew diet/thin liquids with strict aspiration precautions as outlined below. Suspect this is patient's baseline swallow function and therefore, skilled acute therapy provided by a speech-language pathologist is not indicated at this time. SLP will sign off.  Please reconsult with any changes or if SLP can be of any further assistance. PLAN :  Recommendations and Planned Interventions:  -- easy to chew diet/thin liquids  -- upright for all PO   -- try to limit distractions and movements during mealtimes as much as possible  -- supervision during mealtimes  -- alternate liquids and solids  -- SLP to sign off  Discharge Recommendations: None     SUBJECTIVE:   Patient with no verbalizations during session, participated appropriately. OBJECTIVE:     Past Medical History:   Diagnosis Date    Cerebral palsy ([address]     History of seizures     Mental developmental delay      Past Surgical History:   Procedure Laterality Date    HX HEENT  13 yrs old    Tonsillectomy and Adenoidectomy    HX ORTHOPAEDIC  2002    Feet w/ Skin Grafts     Prior Level of Function/Home Situation:   Home Situation  Support Systems: Parent(s),Other Family Member(s)  Patient Expects to be Discharged to[de-identified] Home with family assistance (TBD/subject to change pending recommendations)  Diet prior to admission: regular/thin  Current Diet:  puree/thin   Radiologist: Dr. Victoria Rossi: Lateral;Fluoro  Patient Position: upright in housted chair    Trial 1: Trial 2:   Consistency Presented: Thin liquid Consistency Presented: Puree; Solid   How Presented: Straw;Successive swallows How Presented: Spoon   Consistency Amount:  (60ml) Consistency Amount:  (1 tsp)   Bolus Acceptance: No impairment Bolus Acceptance: No impairment   Bolus Formation/Control: Impaired: Other (comment) (reduced control with rapid posterior propulsion) Bolus Formation/Control: No impairment:     Propulsion: Discoordination; Other (comment) (rapid) Propulsion: No impairment   Oral Residue: Other (comment) (mild diffuse oral residue) Oral Residue: None   Initiation of Swallow: Triggered at vallecula Initiation of Swallow: Triggered at valleculae   Timing: No impairment Timing: No impairment   Penetration: To cords; Before swallow (from remature spillage due to rocking x1 - cleared) Penetration: None Aspiration/Timing: No evidence of aspiration Aspiration/Timing: No evidence of aspiration   Pharyngeal Clearance: No residue Pharyngeal Clearance: Vallecular residue; Less than 10% (cleared with additional swallow)                           Decreased Tongue Base Retraction?: No  Laryngeal Elevation: WFL (within functional limits)  Aspiration/Penetration Score: 4 (Penetration/No residue-Contrast contacts the folds, and is ejected)  Pharyngeal Symmetry: Not assessed  Pharyngeal-Esophageal Segment: No impairment  Pharyngeal Dysfunction: None    Oral Phase Severity: Mild  Pharyngeal Phase Severity: N/A  NOMS:   The NOMS functional outcome measure was used to quantify this patient's level of swallowing impairment. Based on the NOMS, the patient was determined to be at level 5 for swallow function         NOMS Swallowing Levels:  Level 1 (CN): NPO  Level 2 (CM): NPO but takes consistency in therapy  Level 3 (CL): Takes less than 50% of nutrition p.o. and continues with nonoral feedings; and/or safe with mod cues; and/or max diet restriction  Level 4 (CK): Safe swallow but needs mod cues; and/or mod diet restriction; and/or still requires some nonoral feeding/supplements  Level 5 (CJ): Safe swallow with min diet restriction; and/or needs min cues  Level 6 (CI): Independent with p.o.; rare cues; usually self cues; may need to avoid some foods or needs extra time  Level 7 ([address] [place] [address]): Independent for all p.o.  GERHARD. (2003). National Outcomes Measurement System (NOMS): Adult Speech-Language Pathology User's Guide. COMMUNICATION/EDUCATION:     The patients plan of care including findings from MBS, recommendations, planned interventions, and recommended diet changes were discussed with: Registered nurse. Patient/family have participated as able in goal setting and plan of care. Patient/family agree to work toward stated goals and plan of care.     Thank you for this referral.  Jb Krishnamurthy M.S. CF-SLP   Speech Language Pathologist     Time Calculation: 36 mins

## 2022-01-24 NOTE — PROGRESS NOTES
Medical Progress Note      NAME: Cheryle Kyle   :  1990  MRM:  [id number]    Date/Time: 2022  3:55 PM         Subjective:     Per H&P \" 72-year-old -American female with history of cerebral palsy, seizure disorder, developmental delay, and behavioral disturbance presents to the ED with reports of suspected aspiration of food/foreign body into the airway during a meal earlier in the day she was observed choking on a hamburger.  She has been in her usual state of health prior to this event. Reportedly, the father attempted a finger sweep and the Heimlich maneuver with little improvement.  Information obtained primarily from staff, records, and family as the patient is unable provide much meaningful history.  In the ED, the patient was hypoxic with SPO2 in the 80s on room air with some remittent but nonsustained improvement into the 90s with NT suctioning and supplemental oxygen.  She was also noted to be tachycardic with heart rate as high as 150s.  The patient was significantly anxious and distraught in the ED laboratory evaluation was initially unavailable. Leamon Repine access was eventually obtained the patient was sedated and intubated with plan for bronchoscopy.  Critical care consultation was obtained and the patient was admitted to ICU for further evaluation and treatment. \"    Today, Discussed with mother who states the patient was consuming regular diet and eating Hamburger when she was witnessed aspirating food. Mother reports that her daughter is back to her baseline.       Past Medical History reviewed and unchanged from Admission History and Physical    Review of Systems: unable to obtain due to AMS         Objective:       Vitals:      Last 24hrs VS reviewed since prior progress note.  Most recent are:    Visit Vitals  /78 (BP 1 Location: Right upper arm, BP Patient Position: Sitting)   Pulse (!) 121   Temp 98.4 °F (36.9 °C)   Resp 20   Ht 5' 2\" (1.575 m)   Wt 51.4 kg (113 lb 6.4 oz) SpO2 95%   BMI 20.74 kg/m²     SpO2 Readings from Last 6 Encounters:   01/24/22 95%   03/14/18 99%   01/25/13 95%            Intake/Output Summary (Last 24 hours) at 1/24/2022 1555  Last data filed at 1/23/2022 2014  Gross per 24 hour   Intake 50 ml   Output 50 ml   Net 0 ml          Exam:      Physical Exam       GENERAL:  Altered sitting up in bed and is moving all of her extremities  HEENT:  Head was atraumatic and normocephalic. Eyes:  Extraocular muscles were intact. The patient was anicteric. Pupils were equally reactive to light. Ears: There were no lesions. Nose:  No lesions were noted. Throat: There was no thrush, no exudate, no erythema. There was no evidence of gum bleeding. NECK:  Supple. There was no JVD. No bruit was heard over the carotids. Full range of motion. CHEST:  No chest wall tenderness, no rashes or lesions. HEART:  Normal S1, S2. Regular rate and rhythm without murmurs, gallops or rubs. LUNGS:  Clear and good breath sounds equally. No wheezing. No rhonchi. ABDOMEN:  Soft, nontender. No organomegaly. Positive bowel sounds in all four quadrants. No rebound tenderness, No guarding. MUSCULOSKELETAL:  There was no deformity. There was full range of motion in all the extremities. EXTREMITIES: No edema, No clubbing, No cyanosis. BACK: Atraumatic, Inspection normal, Full range of motion. SKIN:  Normal color, turgor and temperature. No ulcerations or rashes noted. Danny Power NEUROLOGICAL:  Altered, moving all of her extremitiets.         Lab Data Reviewed: (see below)      Medications:  Current Facility-Administered Medications   Medication Dose Route Frequency    glucose chewable tablet 16 g  4 Tablet Oral PRN    dextrose (D50W) injection syrg 12.5-25 g  12.5-25 g IntraVENous PRN    glucagon (GLUCAGEN) injection 1 mg  1 mg IntraMUSCular PRN    dextrose 5% with KCl 20 mEq/L infusion   IntraVENous CONTINUOUS    lactated ringers bolus infusion 1,000 mL  1,000 mL IntraVENous ONCE  LORazepam (ATIVAN) injection 1-2 mg  1-2 mg IntraVENous Q6H PRN    piperacillin-tazobactam (ZOSYN) 3.375 g in 0.9% sodium chloride (MBP/ADV) 100 mL MBP  3.375 g IntraVENous Q8H    sodium chloride (NS) flush 5-40 mL  5-40 mL IntraVENous Q8H    sodium chloride (NS) flush 5-40 mL  5-40 mL IntraVENous PRN    acetaminophen (TYLENOL) tablet 650 mg  650 mg Oral Q6H PRN    Or    acetaminophen (TYLENOL) suppository 650 mg  650 mg Rectal Q6H PRN    polyethylene glycol (MIRALAX) packet 17 g  17 g Oral DAILY PRN    ondansetron (ZOFRAN ODT) tablet 4 mg  4 mg Oral Q8H PRN    Or    ondansetron (ZOFRAN) injection 4 mg  4 mg IntraVENous Q6H PRN    enoxaparin (LOVENOX) injection 40 mg  40 mg SubCUTAneous DAILY    famotidine (PF) (PEPCID) 20 mg in sodium chloride (NS) 10 mL injection  20 mg IntraVENous BID    valproate (DEPACON) 500 mg in 0.9% sodium chloride 50 mL IVPB  500 mg IntraVENous BID    albuterol-ipratropium (DUO-NEB) 2.5 MG-0.5 MG/3 ML  3 mL Nebulization Q6H PRN       ______________________________________________________________________      Lab Review:     Recent Labs     01/24/22  0512 01/23/22  0242 01/23/22  0119   WBC 20.3* 17.5* PLEASE DISREGARD RESULTS   HGB 14.1 13.7 PLEASE DISREGARD RESULTS   HCT 43.9 40.8 PLEASE DISREGARD RESULTS    248 PLEASE DISREGARD RESULTS     Recent Labs     01/24/22  0512 01/23/22  0242    141   K 3.5 3.8    114*   CO2 23 21   GLU 53* 122*   BUN 12 16   CREA 0.98 0.65   CA 9.2 8.7   MG 2.2  --    ALB  --  3.4*   ALT  --  14     No components found for: GLPOC  Recent Labs     01/23/22  0320   PH 7.31*   PCO2 40   PO2 144*   HCO3 20*   FIO2 40     No results for input(s): INR, INREXT, INREXT in the last 72 hours.            Assessment:     Active Problems:    Aspiration into airway (1/22/2022)           Assessment/ Plan:               Assessment and Plan:     # Acute hypoxic respiratory failure- resolved  # Acute aspiration of foreign body   #Aspiration pneumonitis vs pneumonia    - s/p intubation with bronch and removal of food objects, extubated 1/23/22    - Infiltrations noted on repeat CXR, monitor patient received Ancef and zosyn while in ICU    - Will continue zosyn for now and monitor as patient has been febrile  - Ordered fluid bolus today, started D5W as patient has been hypoglycemic    - continue pulmonary toileting     #Hypoglcemia     -Started D5W, plan to start diet today as the patient was cleared by speech  # H/o cerebral palsy     - Continue depakote  # H/o Asthma     - Continue prn duo nebs   # Leukocytosis     - possibly reactive Vs secondary to PNA, will continue ABX for now.    # Tachycardia     - Consider po metoprolol if continues to be tachy         Total time spent with patient: 30 Minutes                  Care Plan discussed with: Family    Discussed:  Care Plan    Prophylaxis:  Lovenox    Disposition:  Home w/Family           ___________________________________________________    Attending Physician: Shanita Bowers DO

## 2022-01-24 NOTE — PROGRESS NOTES
Informed by lab of patient having blood sugar of 53. BS 67 after being re-checked. Dr. Marli Hartmann paged and orders have been placed for prn medications for hypoglycemia. Day-shift RN notified.

## 2022-01-25 ENCOUNTER — TELEPHONE (OUTPATIENT)
Dept: FAMILY MEDICINE CLINIC | Age: 32
End: 2022-01-25

## 2022-01-25 ENCOUNTER — APPOINTMENT (OUTPATIENT)
Dept: GENERAL RADIOLOGY | Age: 32
DRG: 208 | End: 2022-01-25
Attending: NURSE PRACTITIONER
Payer: COMMERCIAL

## 2022-01-25 VITALS
BODY MASS INDEX: 20.87 KG/M2 | HEIGHT: 62 IN | HEART RATE: 72 BPM | DIASTOLIC BLOOD PRESSURE: 76 MMHG | WEIGHT: 113.4 LBS | OXYGEN SATURATION: 96 % | TEMPERATURE: 97.5 F | RESPIRATION RATE: 19 BRPM | SYSTOLIC BLOOD PRESSURE: 110 MMHG

## 2022-01-25 PROBLEM — T17.908A ASPIRATION INTO AIRWAY: Status: RESOLVED | Noted: 2022-01-22 | Resolved: 2022-01-25

## 2022-01-25 LAB
ANION GAP SERPL CALC-SCNC: 9 MMOL/L (ref 5–15)
BUN SERPL-MCNC: 8 MG/DL (ref 6–20)
BUN/CREAT SERPL: 9 (ref 12–20)
CALCIUM SERPL-MCNC: 9.1 MG/DL (ref 8.5–10.1)
CHLORIDE SERPL-SCNC: 107 MMOL/L (ref 97–108)
CO2 SERPL-SCNC: 26 MMOL/L (ref 21–32)
CREAT SERPL-MCNC: 0.9 MG/DL (ref 0.55–1.02)
ERYTHROCYTE [DISTWIDTH] IN BLOOD BY AUTOMATED COUNT: 13.7 % (ref 11.5–14.5)
GLUCOSE SERPL-MCNC: 65 MG/DL (ref 65–100)
HCT VFR BLD AUTO: 38.3 % (ref 35–47)
HGB BLD-MCNC: 12.7 G/DL (ref 11.5–16)
MAGNESIUM SERPL-MCNC: 2.4 MG/DL (ref 1.6–2.4)
MCH RBC QN AUTO: 31.3 PG (ref 26–34)
MCHC RBC AUTO-ENTMCNC: 33.2 G/DL (ref 30–36.5)
MCV RBC AUTO: 94.3 FL (ref 80–99)
NRBC # BLD: 0 K/UL (ref 0–0.01)
NRBC BLD-RTO: 0 PER 100 WBC
PLATELET # BLD AUTO: 206 K/UL (ref 150–400)
PMV BLD AUTO: 11.7 FL (ref 8.9–12.9)
POTASSIUM SERPL-SCNC: 3.6 MMOL/L (ref 3.5–5.1)
RBC # BLD AUTO: 4.06 M/UL (ref 3.8–5.2)
SODIUM SERPL-SCNC: 142 MMOL/L (ref 136–145)
WBC # BLD AUTO: 13.8 K/UL (ref 3.6–11)

## 2022-01-25 PROCEDURE — 36415 COLL VENOUS BLD VENIPUNCTURE: CPT

## 2022-01-25 PROCEDURE — 71045 X-RAY EXAM CHEST 1 VIEW: CPT

## 2022-01-25 PROCEDURE — 74011250636 HC RX REV CODE- 250/636: Performed by: NURSE PRACTITIONER

## 2022-01-25 PROCEDURE — 80048 BASIC METABOLIC PNL TOTAL CA: CPT

## 2022-01-25 PROCEDURE — 74011000250 HC RX REV CODE- 250: Performed by: NURSE PRACTITIONER

## 2022-01-25 PROCEDURE — 74011000258 HC RX REV CODE- 258: Performed by: NURSE PRACTITIONER

## 2022-01-25 PROCEDURE — 85027 COMPLETE CBC AUTOMATED: CPT

## 2022-01-25 PROCEDURE — 74011250637 HC RX REV CODE- 250/637: Performed by: NURSE PRACTITIONER

## 2022-01-25 PROCEDURE — 93005 ELECTROCARDIOGRAM TRACING: CPT

## 2022-01-25 PROCEDURE — 83735 ASSAY OF MAGNESIUM: CPT

## 2022-01-25 RX ORDER — AMOXICILLIN AND CLAVULANATE POTASSIUM 875; 125 MG/1; MG/1
1 TABLET, FILM COATED ORAL EVERY 12 HOURS
Qty: 14 TABLET | Refills: 0 | Status: SHIPPED | OUTPATIENT
Start: 2022-01-25 | End: 2022-03-15

## 2022-01-25 RX ORDER — METOPROLOL TARTRATE 25 MG/1
25 TABLET, FILM COATED ORAL EVERY 12 HOURS
Qty: 60 TABLET | Refills: 0 | Status: SHIPPED
Start: 2022-01-25 | End: 2022-09-22

## 2022-01-25 RX ORDER — AMOXICILLIN AND CLAVULANATE POTASSIUM 875; 125 MG/1; MG/1
1 TABLET, FILM COATED ORAL EVERY 12 HOURS
Status: DISCONTINUED | OUTPATIENT
Start: 2022-01-25 | End: 2022-01-25 | Stop reason: HOSPADM

## 2022-01-25 RX ADMIN — SODIUM CHLORIDE, PRESERVATIVE FREE 20 MG: 5 INJECTION INTRAVENOUS at 09:30

## 2022-01-25 RX ADMIN — PIPERACILLIN AND TAZOBACTAM 3.38 G: 3; .375 INJECTION, POWDER, LYOPHILIZED, FOR SOLUTION INTRAVENOUS at 00:03

## 2022-01-25 RX ADMIN — SODIUM CHLORIDE, PRESERVATIVE FREE 10 ML: 5 INJECTION INTRAVENOUS at 07:20

## 2022-01-25 RX ADMIN — ENOXAPARIN SODIUM 40 MG: 100 INJECTION SUBCUTANEOUS at 09:30

## 2022-01-25 RX ADMIN — PIPERACILLIN AND TAZOBACTAM 3.38 G: 3; .375 INJECTION, POWDER, LYOPHILIZED, FOR SOLUTION INTRAVENOUS at 07:19

## 2022-01-25 RX ADMIN — METOPROLOL TARTRATE 25 MG: 25 TABLET, FILM COATED ORAL at 00:02

## 2022-01-25 RX ADMIN — SODIUM CHLORIDE 500 MG: 9 INJECTION, SOLUTION INTRAVENOUS at 09:30

## 2022-01-25 RX ADMIN — METOPROLOL TARTRATE 25 MG: 25 TABLET, FILM COATED ORAL at 09:30

## 2022-01-25 NOTE — PROGRESS NOTES
Problem: Non-Violent Restraints  Goal: Removal from restraints as soon as assessed to be safe  Outcome: Progressing Towards Goal  Goal: No harm/injury to patient while restraints in use  Outcome: Progressing Towards Goal  Goal: Patient's dignity will be maintained  Outcome: Progressing Towards Goal     Problem: Falls - Risk of  Goal: *Absence of Falls  Description: Document Cornelia Meehan Fall Risk and appropriate interventions in the flowsheet. Outcome: Progressing Towards Goal  Note: Fall Risk Interventions:       Mentation Interventions: Adequate sleep, hydration, pain control,Door open when patient unattended,Family/sitter at [employer] frequent rounding,Reorient patient,Toileting rounds,Update white board    Medication Interventions: Evaluate medications/consider consulting pharmacy    Elimination Interventions: Call light in reach              Problem: Pressure Injury - Risk of  Goal: *Prevention of pressure injury  Description: Document George Scale and appropriate interventions in the flowsheet.   Outcome: Progressing Towards Goal  Note: Pressure Injury Interventions:  Sensory Interventions: Assess changes in LOC    Moisture Interventions: Limit adult briefs,Check for incontinence Q2 hours and as needed    Activity Interventions: Pressure redistribution bed/mattress(bed type)    Mobility Interventions: Assess need for specialty bed    Nutrition Interventions: Document food/fluid/supplement intake    Friction and Shear Interventions: Apply protective barrier, creams and emollients,Lift sheet                Problem: Patient Education: Go to Patient Education Activity  Goal: Patient/Family Education  Outcome: Progressing Towards Goal

## 2022-01-25 NOTE — PROGRESS NOTES
Sent the following message to Lucretia Yu NP via [employer]:  \"I just saw a SEPSIS BPS popup. Her SEPSIS Risk Score = 6. Her MEWS Score = 4. She has been running tachy but now her HR = 140. She spiked a temp earlier today and the nurse has already drawn [facility] earlier today. Please advise. \"    Lucretia Yu NP put in an order to draw a Lactic Acid and started her on Metoprolol 25 mg PO Q12 H.    2155 RIGO Desir added an order for a 12 lead EKG.

## 2022-01-25 NOTE — DISCHARGE SUMMARY
Discharge Summary       PATIENT ID: Soco Hernandez  MRN: [id number]   YOB: 1990    DATE OF ADMISSION: 1/22/2022 10:06 PM    DATE OF DISCHARGE: 1/25/2022  PRIMARY CARE PROVIDER: Luisa Brunner MD     ATTENDING PHYSICIAN: Dr Ben Hearn  DISCHARGING PROVIDER: Severino Gaona MD    To contact this individual call [phone] and ask the  to page. If unavailable ask to be transferred the Adult Hospitalist Department. CONSULTATIONS: None    PROCEDURES/SURGERIES: * No surgery found *    DISCHARGE DIAGNOSES:   ADMISSION SUMMARY AND HOSPITAL COURSE:   Assessment and Plan:     Acute hypoxic respiratory failure- resolved  Acute aspiration of foreign body   Aspiration pneumonitis vs pneumonia    - s/p intubation with bronch and removal of food objects, extubated 1/23/22    - Infiltrations noted on repeat CXR, monitor patient received Ancef and zosyn while in ICU    - Was given IV abx and switch to po abx on day of discharge    -Speech consulted, swallow study done, recommended easy chew with thin liquid diet and avoid distraction while eating.    -recommend patient's pcp to followup on final culture report     Tachycardia, resolved, on metoprolol, resume and f/u with her pcp as outpatient        NOTIFY YOUR PHYSICIAN FOR ANY OF THE FOLLOWING:   Fever over 101 degrees for 24 hours. Chest pain, shortness of breath, fever, chills, nausea, vomiting, diarrhea, change in mentation, falling, weakness, bleeding. Severe pain or pain not relieved by medications, as well as any other signs or symptoms that you may have questions about.     FOLLOW UP APPOINTMENTS:    Follow-up Information     Follow up With Specialties Details Why Contact Kalyani Olvera MD Family Medicine Call for hosp DC followup, medication reconcilliation, final culture report, vitals, and recommend repeat labs [phone] [phone]               DIET: easy chew, thin liquids, avoid distraction while eating    ACTIVITY: as tolerated    DISCHARGE MEDICATIONS:  Current Discharge Medication List      START taking these medications    Details   amoxicillin-clavulanate (AUGMENTIN) 875-125 mg per tablet Take 1 Tablet by mouth every twelve (12) hours. Qty: 14 Tablet, Refills: 0  Start date: 1/25/2022      metoprolol tartrate (LOPRESSOR) 25 mg tablet Take 1 Tablet by mouth every twelve (12) hours. One-time fill from hospital. Helps lower Heart rate (also lowers blood pressure). Qty: 60 Tablet, Refills: 0  Start date: 1/25/2022         CONTINUE these medications which have NOT CHANGED    Details   divalproex (DEPAKOTE SPRINKLE) 125 mg capsule TAKE 4 CAPSULES BY MOUTH TWICE DAILY. Qty: 720 Capsule, Refills: 0    Associated Diagnoses: Seizure ([address]      abobotulinumtoxinA (Dysport) 500 unit solr 500 Units by IntraMUSCular route every three (3) months. emg guided injections into lower extremities for spasticity  Qty: 1 Each, Refills: 3    Associated Diagnoses: Spastic diplegia syndrome (HCC)      onabotulinumtoxinA (BOTOX) 100 unit injection 100 Units by IntraMUSCular route every three (3) months. emg guided botox to bilateral legs due to cerebral palsy. G80.1  Qty: 1 Each, Refills: 3    Associated Diagnoses: Cerebral palsy, unspecified type ([address] Cerebral palsy with spastic diplegia (HCC)      risperiDONE (RISPERDAL) 3 mg tablet Take 3 mg by mouth four (4) times daily. DISPOSITION:    Home With:   OT  PT  HH  RN       Long term SNF/Inpatient Rehab   x Independent/assisted living    Hospice    Other:       PATIENT CONDITION AT DISCHARGE: stable    Code status   x  Full code     DNR      PHYSICAL EXAMINATION AT DISCHARGE:  General:          nad  HEENT:           Atraumatic, anicteric sclerae  Neck:               Supple  Lungs:             Clear to auscultation bilaterally. No Wheezing or Rhonchi. Chest wall:      No tenderness  No Accessory muscle use.   Heart:              Regular rhythm,  No  rubs  Abdomen:        Soft, non-tender. Not distended. Bowel sounds normal  Extremities:     No cyanosis. Symmetrical muscle tone, pulses present  Skin:                Not pale.   Not Jaundiced    Psych:             does not appear agitated or anxious  Neurologic:      awake, moves all extremities      Greater than 30 minutes were spent with the patient on counseling and coordination of care    Signed:   Katherine Ospina MD  1/25/2022  4:12 PM

## 2022-01-25 NOTE — PROGRESS NOTES
Pt is laying in bed with even and unlabored respirations on room air. No signs or complaints of pain at this time. No distress noted. Restraints were started at 0550. Pts blood sugar in the morning was 67. Dr. Bishop Bañuelos was made aware via perfect serve and orders were placed for D50 and continuous fluids with D5. Pts blood sugar was checked after receiving the D5 and it was 128. No other orders were placed to check blood sugar. Pt had a swallow evaluation and a modified barium swallow done. Pt had blood cultures drawn and sent to the lab. Pt had a 101.9 degree fever in the morning and was given rectal tylenol. Plan of care is to continue IV antibiotics at this time. All safety precautions are in place. Bed in low position and locked. Call bell within reach. Problem: Non-Violent Restraints  Goal: Removal from restraints as soon as assessed to be safe  Outcome: Progressing Towards Goal  Goal: No harm/injury to patient while restraints in use  Outcome: Progressing Towards Goal  Goal: Patient's dignity will be maintained  Outcome: Progressing Towards Goal  Goal: Patient Interventions  Outcome: Progressing Towards Goal     Problem: Falls - Risk of  Goal: *Absence of Falls  Description: Document Clearence Skates Fall Risk and appropriate interventions in the flowsheet.   Outcome: Progressing Towards Goal  Note: Fall Risk Interventions:       Mentation Interventions: Door open when patient unattended,Evaluate medications/consider consulting pharmacy    Medication Interventions: Evaluate medications/consider consulting pharmacy    Elimination Interventions: Call light in reach,Patient to call for help with toileting needs,Toilet paper/wipes in reach,Toileting schedule/hourly rounds              Problem: Patient Education: Go to Patient Education Activity  Goal: Patient/Family Education  Outcome: Progressing Towards Goal     Problem: Pressure Injury - Risk of  Goal: *Prevention of pressure injury  Description: Document George Scale and appropriate interventions in the flowsheet.   Outcome: Progressing Towards Goal  Note: Pressure Injury Interventions:  Sensory Interventions: Assess changes in LOC,Assess need for specialty bed,Avoid rigorous massage over bony prominences    Moisture Interventions: Absorbent underpads,Apply protective barrier, creams and emollients,Assess need for specialty bed,Check for incontinence Q2 hours and as needed    Activity Interventions: Increase time out of bed,Pressure redistribution bed/mattress(bed type)    Mobility Interventions: Assess need for specialty bed,Chair cushion,Float heels,HOB 30 degrees or less    Nutrition Interventions: Document food/fluid/supplement intake,Offer support with meals,snacks and hydration    Friction and Shear Interventions: Apply protective barrier, creams and emollients,Feet elevated on foot rest,Foam dressings/transparent film/skin sealants,HOB 30 degrees or less                Problem: Patient Education: Go to Patient Education Activity  Goal: Patient/Family Education  Outcome: Progressing Towards Goal     Problem: Discharge Planning  Goal: *Discharge to safe environment  Outcome: Progressing Towards Goal     Problem: Patient Education: Go to Patient Education Activity  Goal: Patient/Family Education  Outcome: Progressing Towards Goal

## 2022-01-25 NOTE — PROGRESS NOTES
Transition of Care Plan   RUR: 10%   Disposition: The disposition plan is home with family assistance   F/U with PCP/Specialist     Transport: Mom      CM spoke with patient's mother who is bedside and stated she is transporting patient home upon discharge. No needs.     Angi Serrato, CRM

## 2022-01-25 NOTE — PROGRESS NOTES
Clinical Pharmacy Note: Stress Ulcer Prophylaxis Protocol (Non-ICU patients)    Please note that stress ulcer prophylaxis is not indicated for patients outside of the ICU. Xuan Raymond has an order for Pepcid (famotidine) that has been ordered with an indication of stress ulcer prophylaxis.   No other indication for this medication has been noted in the patients chart and therefore it has been discontinued per the [facility] Stress Ulcer Prophylaxis Protocol for eligible patients. Please call with any questions.

## 2022-01-25 NOTE — TELEPHONE ENCOUNTER
Mara Holliday [facility]) [phone]     PT needs a [facility] appointment for aspiration into airways. Please call back Pt back with the Hospital Follow-up date.

## 2022-01-25 NOTE — PROGRESS NOTES
Problem: Non-Violent Restraints  Goal: Removal from restraints as soon as assessed to be safe  Outcome: Progressing Towards Goal  Goal: No harm/injury to patient while restraints in use  Outcome: Progressing Towards Goal  Goal: Patient's dignity will be maintained  Outcome: Progressing Towards Goal  Goal: Patient Interventions  Outcome: Progressing Towards Goal     Problem: Falls - Risk of  Goal: *Absence of Falls  Description: Document Hannah Goldstein Fall Risk and appropriate interventions in the flowsheet. Outcome: Progressing Towards Goal  Note: Fall Risk Interventions:       Mentation Interventions: Adequate sleep, hydration, pain control,Family/sitter at [facility] frequent rounding    Medication Interventions: Evaluate medications/consider consulting pharmacy    Elimination Interventions: Call light in reach              Problem: Patient Education: Go to Patient Education Activity  Goal: Patient/Family Education  Outcome: Progressing Towards Goal     Problem: Pressure Injury - Risk of  Goal: *Prevention of pressure injury  Description: Document George Scale and appropriate interventions in the flowsheet.   Outcome: Progressing Towards Goal  Note: Pressure Injury Interventions:  Sensory Interventions: Assess changes in LOC,Assess need for specialty bed,Minimize linen layers    Moisture Interventions: Limit adult briefs,Check for incontinence Q2 hours and as needed    Activity Interventions: Pressure redistribution bed/mattress(bed type)    Mobility Interventions: Assess need for specialty bed    Nutrition Interventions: Document food/fluid/supplement intake    Friction and Shear Interventions: Apply protective barrier, creams and emollients                Problem: Patient Education: Go to Patient Education Activity  Goal: Patient/Family Education  Outcome: Progressing Towards Goal     Problem: Discharge Planning  Goal: *Discharge to safe environment  Outcome: Progressing Towards Goal     Problem: Patient Education: Go to Patient Education Activity  Goal: Patient/Family Education  Outcome: Progressing Towards Goal

## 2022-01-25 NOTE — PROGRESS NOTES
I sent the following message to Reece Liu NP via [employer] at 0225:  Mariely Porras,    Earlier tonight this pt was tachy, HR = 140, MEWS = 4 and SEPSIS Score = 6. Erica Perera NP put in the following orders: lactic acid (1.7), Metoprolol 25mg PO Q 12, and an EKG. The EKG still has not been done. Would you like to amend that order to a STAT EKG? FYI, I am going off my shift now and BEHZAD Fish will be taking over for me. \"    Alok President, RN aware when I gave her report.

## 2022-01-25 NOTE — PROGRESS NOTES
Attempted to schedule hospital follow up PCP appointment. Office nurse with [facility] will contact the patient with appointment information as there are no available appointments in [place] [id number] within the required time frames.   Oseas Layton, Care Management Specialist.

## 2022-01-26 LAB
ATRIAL RATE: 83 BPM
CALCULATED P AXIS, ECG09: 66 DEGREES
CALCULATED R AXIS, ECG10: 75 DEGREES
CALCULATED T AXIS, ECG11: -34 DEGREES
DIAGNOSIS, 93000: NORMAL
P-R INTERVAL, ECG05: 106 MS
Q-T INTERVAL, ECG07: 374 MS
QRS DURATION, ECG06: 72 MS
QTC CALCULATION (BEZET), ECG08: 439 MS
VENTRICULAR RATE, ECG03: 83 BPM

## 2022-01-26 NOTE — TELEPHONE ENCOUNTER
Returned call from Bolivar Saldivar. She states patient was discharged and to contact patient. Called patient and spoke with mother, who was verified on PHI. Confirmed two patient identifiers. Virtual visit hospital follow up scheduled.

## 2022-01-28 ENCOUNTER — TELEPHONE (OUTPATIENT)
Dept: FAMILY MEDICINE CLINIC | Age: 32
End: 2022-01-28

## 2022-01-28 ENCOUNTER — VIRTUAL VISIT (OUTPATIENT)
Dept: FAMILY MEDICINE CLINIC | Age: 32
End: 2022-01-28

## 2022-01-28 NOTE — TELEPHONE ENCOUNTER
Pt was scheduled for a VV today. It got booked wrong. There were 3 calls made but no answer. Mother said that she didn't get the calls. She said that she didn't make this appt that the hospital did. I let her know that appt was rescheduled for 1/31 @ 3:40 with Dr Jay Abraham. Apologized for the inconvenience.

## 2022-01-29 LAB
BACTERIA SPEC CULT: NORMAL
SERVICE CMNT-IMP: NORMAL

## 2022-01-29 NOTE — PROGRESS NOTES
Patient was scheduled for virtual visit and could not connect. Staff made multiple calls. Patient rescheduled. Refer to nurse note for details.

## 2022-01-31 ENCOUNTER — VIRTUAL VISIT (OUTPATIENT)
Dept: FAMILY MEDICINE CLINIC | Age: 32
End: 2022-01-31
Payer: COMMERCIAL

## 2022-01-31 DIAGNOSIS — Z09 HOSPITAL DISCHARGE FOLLOW-UP: ICD-10-CM

## 2022-01-31 DIAGNOSIS — R00.0 TACHYCARDIA: ICD-10-CM

## 2022-01-31 DIAGNOSIS — Z87.01 HISTORY OF ASPIRATION PNEUMONIA: ICD-10-CM

## 2022-01-31 DIAGNOSIS — G80.9 CEREBRAL PALSY, UNSPECIFIED TYPE (HCC): Primary | ICD-10-CM

## 2022-01-31 PROCEDURE — 99214 OFFICE O/P EST MOD 30 MIN: CPT | Performed by: FAMILY MEDICINE

## 2022-01-31 RX ORDER — DIAZEPAM 2 MG/1
TABLET ORAL
COMMUNITY
Start: 2021-12-21 | End: 2022-01-31 | Stop reason: ALTCHOICE

## 2022-01-31 NOTE — PATIENT INSTRUCTIONS
Aspiration Pneumonia: Care Instructions  Your Care Instructions     Aspiration pneumonia is an inflammation of the lungs. It may occur after you breathe in large amounts of foreign material, such as food, liquid, vomit, or mucus. Aspiration may happen because of a health problem that makes it hard to swallow. These problems include stroke or seizure. Pneumonia makes it hard to breathe. Follow-up care is a key part of your treatment and safety. Be sure to make and go to all appointments, and call your doctor if you are having problems. It's also a good idea to know your test results and keep a list of the medicines you take. How can you care for yourself at home? To help with swallowing   · You may need to do exercises to train your muscles to work together to help you swallow. You may also need to learn how to position your body or how to put food in your mouth to be able to swallow better. · You may need to change the foods you eat. Your doctor may tell you to eat certain foods and liquids to make swallowing easier. · You may need to change how you prepare foods. For example, you may need to add thickeners to some liquids, or puree certain foods in a . To help with pneumonia   · Take your antibiotics as directed. Do not stop taking them just because you feel better. You need to take the full course of antibiotics. · Take your medicines exactly as prescribed. For example, your doctor may have given you medicine that makes breathing easier. Call your doctor if you think you are having a problem with your medicine. · Get plenty of rest and sleep. You may feel weak and tired for a while, but your energy level will improve with time. · Take care of your cough so you can rest. A cough that brings up mucus from your lungs is common with pneumonia. It is one way your body gets rid of the infection.  But if coughing keeps you from resting or causes severe fatigue and chest-wall pain, talk to your doctor. He or she may suggest that you take a medicine to reduce the cough. · Use a humidifier to increase the moisture in the air. Dry air makes coughing worse. Follow the instructions for cleaning the machine. · Do not smoke, and avoid others' smoke. Smoke will make your cough last longer. If you need help quitting, talk to your doctor about stop-smoking programs and medicines. These can increase your chances of quitting for good. · Take an over-the-counter pain medicine, such as acetaminophen (Tylenol), ibuprofen (Advil, Motrin), or naproxen (Aleve) to help reduce fever and reduce chest pain caused by coughing. Read and follow all instructions on the label. · Do not take two or more pain medicines at the same time unless the doctor told you to. Many pain medicines have acetaminophen, which is Tylenol. Too much acetaminophen (Tylenol) can be harmful. When should you call for help? Call 911 anytime you think you may need emergency care. For example, call if:    · You have severe trouble breathing. Call your doctor now or seek immediate medical care if:    · You have a new or higher fever.     · You have new or worse trouble breathing.     · You cough up blood.     · You are dizzy or lightheaded, or you feel like you may faint. Watch closely for changes in your health, and be sure to contact your doctor if:    · You do not get better as expected.     · You are coughing more deeply or more often. Where can you learn more? Go to http://www.[employer].com/  Enter D757 in the search box to learn more about \"Aspiration Pneumonia: Care Instructions. \"  Current as of: July 6, 2021               Content Version: 13.0  © 2575-6648 [employer]. Care instructions adapted under license by [employer] (which disclaims liability or warranty for this information).  If you have questions about a medical condition or this instruction, always ask your healthcare professional. [employer] disclaims any warranty or liability for your use of this information.

## 2022-01-31 NOTE — PROGRESS NOTES
Chief Complaint   Patient presents with    Transitions Of Care     follow up         1. \"Have you been to the ER, urgent care clinic since your last visit? Hospitalized since your last visit? \"Yes When: 1/25/22 Where: [facility] Reason for visit: Choking on food developed pnuemonia    2. \"Have you seen or consulted any other health care providers outside of the [address] [place] [address] since your last visit? \" no    3. For patients over 45: Has the patient had a colonoscopy? NA - based on age     If the patient is female:    4. For patients over 36: Has the patient had a mammogram? NA - based on age    11. For patients over 21: Has the patient had a pap smear?  NA - based on age       1 most recent PHQ Screens 7/27/2020   Little interest or pleasure in doing things Not at all   Feeling down, depressed, irritable, or hopeless Not at all   Total Score PHQ 2 0       Health Maintenance Due   Topic Date Due    Hepatitis C Screening  Never done    COVID-19 Vaccine (1) Never done    DTaP/Tdap/Td series (1 - Tdap) Never done    Pap Smear  Never done    Flu Vaccine (1) Never done

## 2022-01-31 NOTE — PROGRESS NOTES
Alanna Wong is a 36 y.o. female who was seen by synchronous (real-time) audio-video technology on 1/31/2022 for Transitions Of Care (follow up)    Virtual visit was conducted with mother who is legal caretaker and person on PHI. This visit visit was conducted as a hospital discharge follow-up. Assessment & Plan:   Diagnoses and all orders for this visit:    1. Cerebral palsy, unspecified type ([address]  Assessment & Plan:   monitored by specialist. No acute findings meriting change in the plan      2. Hospital discharge follow-up    3. Tachycardia    4. History of aspiration pneumonia    I had a long discussion with mother about her hospital course and post discharge care at home. We reviewed soft diet, minimizing distractions during feeding, red flag symptoms for aspiration pneumonia. Also strongly recommended to check her heart rate and blood pressure as she was discharged on metoprolol for tachycardia. If heart rate stays below 60 and blood pressure staying below 365 systolic or 60 diastolic, to contact office right away. Discussed hydration to help with tachycardia. Subjective:    Hospital Follow Up  Alanna Wong is seen for follow up from recent admission to [facility] on 1/22/22. Chris Lozano She was discharged home 01/25/2022. We reviewed the active problem list, medication list, allergies, notes from last encounter, lab results, imaging, hospitalist admission and discharge summaries. .  She presented with suspected aspiration of food/foreign body into her airway during a meal.  She was observed choking on a hamburger. Father attempted a finger sweep and to heimlich maneuver with no improvement. Patient with significant underlying history of developmental delay, cerebral palsy, seizure disorder and behavioral disturbances. Patient was noted to be tachycardic with her heart rate in 150s and oxygen saturation in the 80s on room air.   Initial attempt with nasotracheal suctioning and supplemental oxygen did not work. Ultimately, patient was intubated with bronchoscopy and had piece of food removed. She was extubated next day. Repeat x-ray chest was concerning infiltrations consistent with aspiration pneumonia, she was started on IV Ancef and Zosyn while she was in ICU. She was discharged on oral antibiotics Augmentin. Speech therapy consulted, swallow study done and was recommended to have soft diet with avoiding distractions while eating. She was also discharged on metoprolol to her persistent tachycardia  She is taking her all her meds as directed & without any side effects. Mother reports that her symptoms are significantly improved. Prior to Admission medications    Medication Sig Start Date End Date Taking? Authorizing Provider   amoxicillin-clavulanate (AUGMENTIN) 875-125 mg per tablet Take 1 Tablet by mouth every twelve (12) hours. 1/25/22  Yes Ana Ortiz MD   metoprolol tartrate (LOPRESSOR) 25 mg tablet Take 1 Tablet by mouth every twelve (12) hours. One-time fill from hospital. Helps lower Heart rate (also lowers blood pressure). 1/25/22  Yes Ana Ortiz MD   divalproex (DEPAKOTE SPRINKLE) 125 mg capsule TAKE 4 CAPSULES BY MOUTH TWICE DAILY. 12/30/21  Yes Anju Duarte MD   risperiDONE (RISPERDAL) 3 mg tablet Take 3 mg by mouth four (4) times daily. Yes Provider, Historical     Patient Active Problem List    Diagnosis Date Noted    Functional urinary incontinence 06/15/2021    [address] [address]     Mental developmental delay     History of seizures      Current Outpatient Medications   Medication Sig Dispense Refill    amoxicillin-clavulanate (AUGMENTIN) 875-125 mg per tablet Take 1 Tablet by mouth every twelve (12) hours. 14 Tablet 0    metoprolol tartrate (LOPRESSOR) 25 mg tablet Take 1 Tablet by mouth every twelve (12) hours. One-time fill from hospital. Helps lower Heart rate (also lowers blood pressure).  60 Tablet 0    divalproex (DEPAKOTE SPRINKLE) 125 mg capsule TAKE 4 CAPSULES BY MOUTH TWICE DAILY. 720 Capsule 0    risperiDONE (RISPERDAL) 3 mg tablet Take 3 mg by mouth four (4) times daily. No Known Allergies  Past Medical History:   Diagnosis Date    Cerebral palsy ([address]     History of seizures     Mental developmental delay      Past Surgical History:   Procedure Laterality Date    HX HEENT  13 yrs old    Tonsillectomy and Adenoidectomy    HX ORTHOPAEDIC  2002    Feet w/ Skin Grafts     Family History   Problem Relation Age of Onset    No Known Problems Mother     No Known Problems Father      Social History     Tobacco Use    Smoking status: Never Smoker    Smokeless tobacco: Never Used   Substance Use Topics    Alcohol use: Not on file       Review of Systems   Constitutional: Negative for chills, fever and malaise/fatigue. HENT: Negative for congestion, ear pain, sore throat and tinnitus. Eyes: Negative for blurred vision, double vision, pain and discharge. Respiratory: Negative for cough, shortness of breath and wheezing. Cardiovascular: Negative for chest pain, palpitations and leg swelling. Gastrointestinal: Negative for abdominal pain, blood in stool, constipation, diarrhea, nausea and vomiting. Genitourinary: Negative for dysuria, frequency, hematuria and urgency. Musculoskeletal: Negative for back pain, joint pain and myalgias. Skin: Negative for rash. Neurological: Negative for dizziness, tremors, seizures and headaches. Endo/Heme/Allergies: Negative for polydipsia. Does not bruise/bleed easily. Psychiatric/Behavioral: Negative for depression and substance abuse. The patient is not nervous/anxious.         Objective:     Patient-Reported Vitals 8/31/2020   Patient-Reported Weight 135 lb   Patient-Reported Height -        [INSTRUCTIONS:  \"[x]\" Indicates a positive item  \"[]\" Indicates a negative item  -- DELETE ALL ITEMS NOT EXAMINED]    Constitutional: [x] Appears well-developed and well-nourished [x] No apparent distress      [] Abnormal -     Mental status: [x] Alert and awake  [x] Oriented to person/place/time [x] Able to follow commands    [] Abnormal -     Eyes:   EOM    [x]  Normal    [] Abnormal -   Sclera  [x]  Normal    [] Abnormal -          Discharge [x]  None visible   [] Abnormal -     HENT: [x] Normocephalic, atraumatic  [] Abnormal -   [x] Mouth/Throat: Mucous membranes are moist    External Ears [x] Normal  [] Abnormal -    Neck: [x] No visualized mass [] Abnormal -     Pulmonary/Chest: [x] Respiratory effort normal   [x] No visualized signs of difficulty breathing or respiratory distress        [] Abnormal -      Musculoskeletal:   [x] Normal gait with no signs of ataxia         [x] Normal range of motion of neck        [] Abnormal -     Neurological:        [x] No Facial Asymmetry (Cranial nerve 7 motor function) (limited exam due to video visit)          [x] No gaze palsy        [] Abnormal -          Skin:        [x] No significant exanthematous lesions or discoloration noted on facial skin         [] Abnormal -            Psychiatric:       [x] Normal Affect [] Abnormal -        [x] No Hallucinations    Other pertinent observable physical exam findings:-        We discussed the expected course, resolution and complications of the diagnosis(es) in detail. Medication risks, benefits, costs, interactions, and alternatives were discussed as indicated. I advised her to contact the office if her condition worsens, changes or fails to improve as anticipated. She expressed understanding with the diagnosis(es) and plan. Zaria Goodmans, was evaluated through a synchronous (real-time) audio-video encounter. The patient (or guardian if applicable) is aware that this is a billable service, which includes applicable co-pays. Verbal consent to proceed has been obtained.  The visit was conducted pursuant to the emergency declaration under the [facility] waiver authority and the [employer] and [place] General Act. Patient identification was verified, and a caregiver was present when appropriate. The patient was located at home in a state where the provider was licensed to provide care.     This service was provided through telehealth, between patient Luis Alfredo Christensenanselmo jaquez from home and Joyce Lujan MD from [facility]     I discussed with the patient the nature of our telemedicine visits, that:      I would evaluate the patient and recommend diagnostics and treatments based on my assessment   Our sessions are not being recorded and that personal health information is protected   Our team would provide follow up care in person if/when the patient needs it    Heber Self MD

## 2022-02-01 ENCOUNTER — TELEPHONE (OUTPATIENT)
Dept: NEUROLOGY | Age: 32
End: 2022-02-01

## 2022-02-03 ENCOUNTER — TELEPHONE (OUTPATIENT)
Dept: NEUROLOGY | Age: 32
End: 2022-02-03

## 2022-02-03 NOTE — PROGRESS NOTES
Neurology Note    Patient ID:  Israel Schwarz  [id number]  62 y.o.  1990      Date of Consultation:  February 3, 2022        Assessment and Plan:    The patient is a 33-year-old female with cerebral palsy, seizures and increasing spasticity in her lower extremities. 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 Right side:     Muscles Number of units Number of sites Total units injected   Semitendinosus 125 units 1  125 units   Semimembranosus  125 units  1  125 units     Left side:     Muscles Number of units Number of sites Total units injected         Semitendinosus  125 units  1  125 units   Semimembranosus  125 units  1  125 units       Amount of botulinum toxin injected: 500 units    Amount of wastage: 0 units    Total amount of botulinum toxin: 500 units    There was no immediate complications. Blood loss was minimal    The patient was told to call the office or go to the nearest emergency room if side effects arise. The patient will return in 3 months for re-evaluation and consideration of future injections               Subjective: Nonverbal, per mom, the patient is walking much less and has stiffness in her legs. History of Present Illness:   Israel Schwarz is a 36 y.o. female with a history of cognitive developmental delay, seizures and cerebral palsy who was referred for EMG guided botulinum toxin injections due to increasing spasticity in her lower extremities. The hope is that botulinum toxin can improve range of motion and decrease pain and spasms.     Past Medical History:   Diagnosis Date    C[facility] ([facility] [address]     History of seizures     Mental developmental delay         Past Surgical History:   Procedure Laterality Date    HX HEENT  13 yrs old    Tonsillectomy and Adenoidectomy    HX ORTHOPAEDIC  2002    Feet w/ Skin Grafts        Family History   Problem Relation Age of Onset    No Known Problems Mother     No Known Problems Father         Social History     Tobacco Use    Smoking status: Never Smoker    Smokeless tobacco: Never Used   Substance Use Topics    Alcohol use: Not on file        No Known Allergies     Prior to Admission medications    Medication Sig Start Date End Date Taking? Authorizing Provider   amoxicillin-clavulanate (AUGMENTIN) 875-125 mg per tablet Take 1 Tablet by mouth every twelve (12) hours. 1/25/22   Rell Salinas MD   metoprolol tartrate (LOPRESSOR) 25 mg tablet Take 1 Tablet by mouth every twelve (12) hours. One-time fill from hospital. Helps lower Heart rate (also lowers blood pressure). 1/25/22   Rell Salinas MD   divalproex (DEPAKOTE SPRINKLE) 125 mg capsule TAKE 4 CAPSULES BY MOUTH TWICE DAILY. 12/30/21   Woo Lewis MD   risperiDONE (RISPERDAL) 3 mg tablet Take 3 mg by mouth four (4) times daily. Provider, Historical       Review of Systems:    [x]Unable to obtain  ROS due to  [x]mental status change  []sedated   []intubated    Objective: There were no vitals taken for this visit. Physical Exam:      General:  appears well nourished in no acute distress  Neck: no carotid bruits  Lungs: clear to auscultation  Heart:  no murmurs, regular rate  Lower extremity: peripheral pulses palpable and no edema  Skin: intact    Neurological exam:    The patient is awake and alert. She has no verbal output. She has significant cognitive decline. She was present in clinic today with her mother and her father. Cranial nerves:   II-XII were tested    She has generalized quadriparesis.   She has increased tone and spasticity in her lower extremities. This is most notable in her hamstring muscles including semimembranosus and semitendinosus. She has brisk reflexes throughout. She will was not cooperative with a detailed neurological examination due to her cognitive status and agitation. Labs:     Lab Results   Component Value Date/Time    Sodium 142 01/25/2022 02:55 AM    Potassium 3.6 01/25/2022 02:55 AM    Chloride 107 01/25/2022 02:55 AM    Glucose 65 01/25/2022 02:55 AM    BUN 8 01/25/2022 02:55 AM    Creatinine 0.90 01/25/2022 02:55 AM    Calcium 9.1 01/25/2022 02:55 AM    WBC 13.8 (H) 01/25/2022 02:55 AM    HCT 38.3 01/25/2022 02:55 AM    HGB 12.7 01/25/2022 02:55 AM    PLATELET 488 15/68/7797 02:55 AM       Imaging:    No results found for this or any previous visit. No results found for this or any previous visit.              Patient Active Problem List   Diagnosis Code    Cerebral palsy ([facility] [address] G80.9    Mental developmental delay F81.9    History of seizures Z87.898    Functional urinary incontinence R39.81                   Signed By:  Doe Dewey DO FAAN    February 3, 2022

## 2022-02-04 ENCOUNTER — OFFICE VISIT (OUTPATIENT)
Dept: NEUROLOGY | Age: 32
End: 2022-02-04
Payer: COMMERCIAL

## 2022-02-04 DIAGNOSIS — G80.1 CEREBRAL PALSY WITH SPASTIC DIPLEGIA (HCC): Primary | ICD-10-CM

## 2022-02-04 PROCEDURE — 64642 CHEMODENERV 1 EXTREMITY 1-4: CPT | Performed by: PSYCHIATRY & NEUROLOGY

## 2022-02-04 PROCEDURE — 64643 CHEMODENERV 1 EXTREM 1-4 EA: CPT | Performed by: PSYCHIATRY & NEUROLOGY

## 2022-02-04 PROCEDURE — 95874 GUIDE NERV DESTR NEEDLE EMG: CPT | Performed by: PSYCHIATRY & NEUROLOGY

## 2022-02-08 NOTE — PROGRESS NOTES
Physician Progress Note      PATIENT:               Marychuy Yan  CSN #:                  [id number]  :                       1990  ADMIT DATE:       2022 10:06 PM  [address] DATE:        2022 6:46 PM  RESPONDING  PROVIDER #:        Lida Burgos MD          QUERY TEXT:    Dr. Zonia Winkler,    Pt admitted with acute aspiration of foreign body and was noted to have aspiration pneumonitis vs pneumonia per the D/C summary. There were two critical care H&Ps on the chart with one noting aspiration pneumonitis and no indication for Abx, and the other noting suspected pneumonitis and/or pneumonia with empiric antibiotics ordered. The pt was treated with IV Zosyn inpatient and discharged on PO Abx. If possible, please document if you are evaluating and/or treating any of the following:     The medical record reflects the following:    Risk Factors: CP, acute aspiration of foreign body    Clinical Indicators:    -- D/C summary and progress notes noted aspiration pneumonitis vs pneumonia  - s/p intubation with bronch and removal of food objects, extubated 22  - infiltrations noted on repeat CXR, monitor patient received Ancef and Zosyn while in ICU  - was given IV abx and switch to PO abx on day of discharge  - Speech consulted, swallow study done, recommended easy chew with thin liquid diet and avoid distraction while eating.    -- H&P  noted acute aspiration of foreign body and likely aspiration pneumonitis  - s/p intubation with bronch and removal of food objects  - no indication for abx -- stop    -- H&P  noted aspiration with suspected pneumonitis and/or pneumonia  - empiric antibiotics with Unasyn    -- CXRs during admission progressed (from first to last):  = Minimal bibasilar atelectasis left greater than right;  = Atelectasis or developing infiltrate slightly more prominent in the left lung base &  = Worsening left basilar airspace disease    Treatment: Intubation with bronchoscopy & removal of food objects, IV Zosyn, IV Ancef, PO Abx @ D/C, imaging studies, ICU admission and monitoring, SLP consult, swallow study    Thank-you,  Shonda Pires RN, [facility], [place], operated by [facility]  Clinical   [phone]  [email or web address]  Options provided:  -- Aspiration pneumonitis without pneumonia  -- Aspiration pneumonitis with superimposed bacterial pneumonia  -- Other - I will add my own diagnosis  -- Disagree - Not applicable / Not valid  -- Disagree - Clinically unable to determine / Unknown  -- Refer to Clinical Documentation Reviewer    PROVIDER RESPONSE TEXT:    Aspiration pneumonitis with possible superimposed bacterial pneumonia, from chest xray    Query created by: Saul Vazquez on 1/31/2022 4:50 PM      Electronically signed by:  Geovanna Doe MD 2/8/2022 9:28 AM

## 2022-02-10 ENCOUNTER — TELEPHONE (OUTPATIENT)
Dept: NEUROLOGY | Age: 32
End: 2022-02-10

## 2022-02-11 NOTE — TELEPHONE ENCOUNTER
Called and spoke with Mei Deleon at [facility]. She stated the billing needs to be completed. Once this is completed they will reach back out when they are ready.

## 2022-02-17 ENCOUNTER — TELEPHONE (OUTPATIENT)
Dept: NEUROLOGY | Age: 32
End: 2022-02-17

## 2022-02-17 NOTE — TELEPHONE ENCOUNTER
It was very challenging for the patient and her parents and decision was to hold off until seeing if there was a good benefit.

## 2022-02-17 NOTE — TELEPHONE ENCOUNTER
Voicemail    [facility] called to schedule delivery of dystort.  Please call [phone] opt 2

## 2022-02-21 ENCOUNTER — TELEPHONE (OUTPATIENT)
Dept: NEUROLOGY | Age: 32
End: 2022-02-21

## 2022-02-21 NOTE — TELEPHONE ENCOUNTER
Called and spoke with pharmacy and notified of dysport  Not be scheduled for now until patient notifies us. She stated the patient has been discharged since October and not sure why they would be calling.
Chelly Halo from [address] called to set up delivery for the pt's Dystort.        [phone], option 2
socks in sitting via LE crossing/independent

## 2022-03-14 DIAGNOSIS — R56.9 SEIZURE (HCC): ICD-10-CM

## 2022-03-14 NOTE — TELEPHONE ENCOUNTER
Received fax refill from [facility] requesting depakote. Last refill:12/30/21    Last visit: 2/14/2022    Next appointment:none    Please review and fill if warranted.

## 2022-03-15 ENCOUNTER — VIRTUAL VISIT (OUTPATIENT)
Dept: FAMILY MEDICINE CLINIC | Age: 32
End: 2022-03-15
Payer: COMMERCIAL

## 2022-03-15 DIAGNOSIS — G80.9 CEREBRAL PALSY, UNSPECIFIED TYPE (HCC): Primary | ICD-10-CM

## 2022-03-15 DIAGNOSIS — R63.4 WEIGHT LOSS: ICD-10-CM

## 2022-03-15 PROCEDURE — 99213 OFFICE O/P EST LOW 20 MIN: CPT | Performed by: FAMILY MEDICINE

## 2022-03-15 RX ORDER — DIVALPROEX SODIUM 125 MG/1
CAPSULE, COATED PELLETS ORAL
Qty: 720 CAPSULE | Refills: 1 | Status: SHIPPED | OUTPATIENT
Start: 2022-03-15 | End: 2022-09-22 | Stop reason: SDUPTHER

## 2022-03-15 NOTE — PROGRESS NOTES
Aakash Murray, was evaluated through a synchronous (real-time) audio-video encounter. The patient (or guardian if applicable) is aware that this is a billable service, which includes applicable co-pays. This Virtual Visit was conducted with patient's (and/or legal guardian's) consent. The visit was conducted pursuant to the emergency declaration under the [facility], [address] [place] [address] authority and the [employer] and [employer] General Act. Patient identification was verified, and a caregiver was present when appropriate. The patient was located in a state where the provider was licensed to provide care. Kirti Nguyen MD    [phone]  Subjective:   Aakash Murray is a 36 y.o. F who was seen for: father is present. Needs RX for stair lift. Doing well. Using normal diet but parents do cut up her food in smaller pieces. Noticed she has lost weight over the past year due to not eating as much. Asking if okay to use Ensure. Wt Readings from Last 3 Encounters:   01/24/22 113 lb 6.4 oz (51.4 kg)   04/26/21 125 lb (56.7 kg)   03/14/18 135 lb (61.2 kg)       Current Outpatient Medications:     divalproex (DEPAKOTE SPRINKLE) 125 mg capsule, TAKE 4 CAPSULES BY MOUTH TWICE DAILY. , Disp: 720 Capsule, Rfl: 1    metoprolol tartrate (LOPRESSOR) 25 mg tablet, Take 1 Tablet by mouth every twelve (12) hours. One-time fill from hospital. Helps lower Heart rate (also lowers blood pressure). , Disp: 60 Tablet, Rfl: 0    risperiDONE (RISPERDAL) 3 mg tablet, Take 3 mg by mouth four (4) times daily. , Disp: , Rfl:     No Known Allergies    Review of Systems   Unable to perform ROS: Patient nonverbal         Objective:     Patient-Reported Vitals 8/31/2020   Patient-Reported Weight 135 lb   Patient-Reported Height -        Constitutional: [x] Appears well-developed and well-nourished [x] No apparent distress      [] Abnormal -     Mental status: [x] Alert and awake  [] Oriented to person/place/time [x] Able to follow commands    [] Abnormal -     Eyes:   EOM    [x]  Normal    [] Abnormal -   Sclera  [x]  Normal    [] Abnormal -          Discharge []  None visible   [] Abnormal -     HENT: [x] Normocephalic, atraumatic  [] Abnormal -   [] Mouth/Throat: Mucous membranes are moist    Neck: [x] No visualized mass [] Abnormal -     Pulmonary/Chest: [x] Respiratory effort normal   [x] No visualized signs of difficulty breathing or respiratory distress        [] Abnormal -         Skin:        [x] No significant exanthematous lesions or discoloration noted on facial skin         [] Abnormal -            Psychiatric:       [] Normal Affect [] Abnormal -        [] No Hallucinations    Other pertinent observable physical exam findings:    Assessment & Plan:     Eve Galindo is a 36 y.o. female who presents today for:    1. Cerebral palsy, unspecified type ([facility] [address]  - OTHER; One stair lift  Dispense: 1 Device; Refill: 0    2. Weight loss  Okay to add on Ensure; monitor weight at home. Medications Discontinued During This Encounter   Medication Reason    amoxicillin-clavulanate (AUGMENTIN) 875-125 mg per tablet LIST CLEANUP           Treatment risks/benefits/costs/interactions/alternatives discussed with patient. Advised patient to call back or return to office if symptoms worsen/change/persist. If patient cannot reach us or should anything more severe/urgent arise he/she should proceed directly to the nearest emergency department. Discussed expected course/resolution/complications of diagnosis in detail with patient. Patient expressed understanding with the diagnosis and plan. This dictation may have been completed with Dragon, the computer voice recognition software. Unanticipated grammatical, syntax, homophones, and other interpretive errors are sometimes inadvertently transcribed by the computer software. Please disregard any errors that have escaped final proofreading. Liat Barry M.D.

## 2022-03-15 NOTE — TELEPHONE ENCOUNTER
Patient was previously seen by Dr. Ary Daniel she gets Botox with Dr. Emmanuel Han she needs a routine follow-up for med check you can place her at my next available appointment and it can be virtual is easier for the family

## 2022-03-20 PROBLEM — R39.81 FUNCTIONAL URINARY INCONTINENCE: Status: ACTIVE | Noted: 2021-06-15

## 2022-04-11 ENCOUNTER — TELEPHONE (OUTPATIENT)
Dept: FAMILY MEDICINE CLINIC | Age: 32
End: 2022-04-11

## 2022-04-11 NOTE — TELEPHONE ENCOUNTER
[facility]  from [facility] called and stated that a prescription for a stair lift for the patient is needed.     Best call back# [phone]    Fax# [phone]

## 2022-04-15 ENCOUNTER — TELEPHONE (OUTPATIENT)
Dept: NEUROLOGY | Age: 32
End: 2022-04-15

## 2022-04-15 DIAGNOSIS — G80.1 SPASTIC DIPLEGIA SYNDROME (HCC): ICD-10-CM

## 2022-04-15 RX ORDER — BOTULINUM TOXIN TYPE A 500 U/1
500 INJECTION, POWDER, LYOPHILIZED, FOR SOLUTION INTRAMUSCULAR
Qty: 1 EACH | Refills: 3 | Status: SHIPPED | OUTPATIENT
Start: 2022-04-15

## 2022-04-15 NOTE — TELEPHONE ENCOUNTER
Dysport 500 units    Osei Mcgraw,     Do you have time to give them a call to see if they can ship it? The auth expires 4/29/22 and the response from [address] : An active authorization already exists for this patient. Proceed with prescribing the Medication or call Pharmacy Customer Care at [phone]. MPA: [id number] Effective: 10/29/2021 12:00:00 AM Terminate: 4/29/2022 12:00:00 AM    If you have any trouble, don't stay on hold because this was a huge complex issue when attempting to order it last time and I am hoping since this is the 2nd order, they have figured it out.

## 2022-04-16 ENCOUNTER — TELEPHONE (OUTPATIENT)
Dept: NEUROLOGY | Age: 32
End: 2022-04-16

## 2022-04-16 NOTE — TELEPHONE ENCOUNTER
Re: Dysport 500 units. [address] - because pt has a Medicaid plan also, they transferred me to [facility]. S/w Valery at [phone], option 2 - very helpful. She will follow it and call me back once it is all completed. I told her we need it in the office preferably by 4/22 as auth expires on 4/29. Appt is 5/2/22. Rukhsana Connor,     I have this one - it is a complex issue w/ pt having two plans, etc.  If they call you, refer them to me.

## 2022-04-18 ENCOUNTER — TELEPHONE (OUTPATIENT)
Dept: NEUROLOGY | Age: 32
End: 2022-04-18

## 2022-04-18 NOTE — TELEPHONE ENCOUNTER
Matt Gill w/ [facility] called to schedule delivery for Thursday, 4/21 for Dysport 500 units. Zero dollars co-pay. Auth valid to 4/29. Appt is 5/2.

## 2022-04-21 ENCOUNTER — DOCUMENTATION ONLY (OUTPATIENT)
Dept: NEUROLOGY | Age: 32
End: 2022-04-21

## 2022-04-21 NOTE — PROGRESS NOTES
dysport came in the office: 4/21/22  MFR: Elizabeth Viveros  LOT: [id number]  Exp: 9/30/22  Appointment: 5/2/22  Provider: Dr. Alejandrina Keating  Specialty pharmacy: [address] [place] [address] Phone Number: [phone]

## 2022-04-27 ENCOUNTER — TELEPHONE (OUTPATIENT)
Dept: NEUROLOGY | Age: 32
End: 2022-04-27

## 2022-04-29 NOTE — PROGRESS NOTES
Neurology Note    Patient ID:  Hadley Prater  [id number]  83 y.o.  1990      Date of Consultation:  April 29, 2022        Assessment and Plan:    The patient is a 61-year-old female with cerebral palsy, seizures and increasing spasticity in her lower extremities. 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 Right side:     Muscles Number of units Number of sites Total units injected   Semitendinosus 125 units 1  125 units   Semimembranosus  125 units  1  125 units     Left side:     Muscles Number of units Number of sites Total units injected         Semitendinosus  125 units  1  125 units   Semimembranosus  125 units  1  125 units       Amount of botulinum toxin injected: 500 units    Amount of wastage: 0 units    Total amount of botulinum toxin: 500 units    There was no immediate complications. Blood loss was minimal    The patient was told to call the office or go to the nearest emergency room if side effects arise. The patient will return in 3 months for re-evaluation and consideration of future injections  I did place a referral for home physical and occupational therapy due to her cerebral palsy to help with mobility, increasing standing duration, and improvement in gait             Subjective: Nonverbal, per mom, the medicine has helped her stand better.          History of Present Illness:   Hadley Prater is a 36 y.o. female with a history of cognitive developmental delay, seizures and cerebral palsy who was referred for EMG guided botulinum toxin injections due to increasing spasticity in her lower extremities. The hope is that botulinum toxin can improve range of motion and decrease pain and spasms. She first received botulinum toxin on February 3, 2022. Please see my history of present illness, examination, and treatment plan from that day. The patient presents with her mother and father today who provides a [de-identified] of the information. They do feel that after she received the botulinum toxin that she was able to stand much easier. I do think this was improvement. They are interested in seeing if this will last and could the patient get at home physical and Occupational Therapy    Past Medical History:   Diagnosis Date    Cerebral palsy ([facility] [address]     History of seizures     Mental developmental delay         Past Surgical History:   Procedure Laterality Date    HX HEENT  13 yrs old    Tonsillectomy and Adenoidectomy    HX ORTHOPAEDIC  2002    Feet w/ Skin Grafts        Family History   Problem Relation Age of Onset    No Known Problems Mother     No Known Problems Father         Social History     Tobacco Use    Smoking status: Never Smoker    Smokeless tobacco: Never Used   Substance Use Topics    Alcohol use: Not on file        No Known Allergies     Prior to Admission medications    Medication Sig Start Date End Date Taking? Authorizing Provider   abobotulinumtoxinA (Dysport) 500 unit solr 500 Units by IntraMUSCular route every three (3) months. emg guided injections into lower extremities for spasticity 4/15/22   Rex Chauhan DO   divalproex (DEPAKOTE SPRINKLE) 125 mg capsule TAKE 4 CAPSULES BY MOUTH TWICE DAILY. 3/15/22   Moriah Pacheco NP   OTHER One stair lift 3/15/22   Yumi Ritchie MD   metoprolol tartrate (LOPRESSOR) 25 mg tablet Take 1 Tablet by mouth every twelve (12) hours. One-time fill from hospital. Helps lower Heart rate (also lowers blood pressure).  1/25/22   Mery Pickens MD   risperiDONE (RISPERDAL) 3 mg tablet Take 3 mg by mouth four (4) times daily. Provider, Historical       Review of Systems:    [x]Unable to obtain  ROS due to  [x]mental status change  []sedated   []intubated    Objective: There were no vitals taken for this visit. Physical Exam:      General:  appears well nourished in no acute distress  Neck: no carotid bruits  Lungs: clear to auscultation  Heart:  no murmurs, regular rate  Lower extremity: peripheral pulses palpable and no edema  Skin: intact    Neurological exam:    The patient is awake and alert. She has no verbal output. She has significant cognitive decline. She was present in clinic today with her mother and her father. Cranial nerves:   II-XII were tested    She has generalized quadriparesis. She has increased tone and spasticity in her lower extremities. This is most notable in her hamstring muscles including semimembranosus and semitendinosus. She has brisk reflexes throughout. She again was not cooperative with a detailed neurological examination due to her cognitive status and agitation. Labs:     Lab Results   Component Value Date/Time    Sodium 142 01/25/2022 02:55 AM    Potassium 3.6 01/25/2022 02:55 AM    Chloride 107 01/25/2022 02:55 AM    Glucose 65 01/25/2022 02:55 AM    BUN 8 01/25/2022 02:55 AM    Creatinine 0.90 01/25/2022 02:55 AM    Calcium 9.1 01/25/2022 02:55 AM    WBC 13.8 (H) 01/25/2022 02:55 AM    HCT 38.3 01/25/2022 02:55 AM    HGB 12.7 01/25/2022 02:55 AM    PLATELET 057 30/54/6308 02:55 AM       Imaging:    No results found for this or any previous visit. No results found for this or any previous visit.              Patient Active Problem List   Diagnosis Code    Cerebral palsy ([facility] [address] G80.9    Mental developmental delay F81.9    History of seizures Z87.898    Functional urinary incontinence R39.81                   Signed By:  Jerlyn Kawasaki, DO FAAN    April 29, 2022

## 2022-05-02 ENCOUNTER — OFFICE VISIT (OUTPATIENT)
Dept: NEUROLOGY | Age: 32
End: 2022-05-02
Payer: COMMERCIAL

## 2022-05-02 DIAGNOSIS — G80.1 SPASTIC DIPLEGIC CEREBRAL PALSY (HCC): ICD-10-CM

## 2022-05-02 PROCEDURE — 95874 GUIDE NERV DESTR NEEDLE EMG: CPT | Performed by: PSYCHIATRY & NEUROLOGY

## 2022-05-02 PROCEDURE — 64643 CHEMODENERV 1 EXTREM 1-4 EA: CPT | Performed by: PSYCHIATRY & NEUROLOGY

## 2022-05-02 PROCEDURE — 64642 CHEMODENERV 1 EXTREMITY 1-4: CPT | Performed by: PSYCHIATRY & NEUROLOGY

## 2022-06-23 ENCOUNTER — TELEPHONE (OUTPATIENT)
Dept: NEUROLOGY | Age: 32
End: 2022-06-23

## 2022-06-29 ENCOUNTER — TELEPHONE (OUTPATIENT)
Dept: NEUROLOGY | Age: 32
End: 2022-06-29

## 2022-07-13 ENCOUNTER — TELEPHONE (OUTPATIENT)
Dept: NEUROLOGY | Age: 32
End: 2022-07-13

## 2022-07-13 NOTE — TELEPHONE ENCOUNTER
Called [facility] regarding Dysport delivery. No co-pay per Cleave Furjensugh. Scheduled to delivery 7/19/2022.

## 2022-07-19 ENCOUNTER — TELEPHONE (OUTPATIENT)
Dept: NEUROLOGY | Age: 32
End: 2022-07-19

## 2022-07-19 NOTE — TELEPHONE ENCOUNTER
Dysport arrived on 7/19/2022.  500 units/vial  [employer]  [facility]  Lot: [id number]  Exp: 02/28/2023  Appt: 8/1/2022    GTIN: [id number]  SN: [id number]

## 2022-07-28 ENCOUNTER — TELEPHONE (OUTPATIENT)
Dept: NEUROLOGY | Age: 32
End: 2022-07-28

## 2022-07-28 NOTE — TELEPHONE ENCOUNTER
Called and spoke with Bebe Bauman. HIPAA verified. Verified patient name/. Rescheduled patient appointment. She will call if needed.

## 2022-07-28 NOTE — TELEPHONE ENCOUNTER
Patient's mother, Bakari Yi, called needing to r/s Pt's appt on 8/1 for the Dysport injections.  [phone]

## 2022-08-29 ENCOUNTER — OFFICE VISIT (OUTPATIENT)
Dept: NEUROLOGY | Age: 32
End: 2022-08-29
Payer: COMMERCIAL

## 2022-08-29 DIAGNOSIS — G80.1 SPASTIC DIPLEGIC CEREBRAL PALSY (HCC): Primary | ICD-10-CM

## 2022-08-29 PROCEDURE — 95874 GUIDE NERV DESTR NEEDLE EMG: CPT | Performed by: PSYCHIATRY & NEUROLOGY

## 2022-08-29 PROCEDURE — 64643 CHEMODENERV 1 EXTREM 1-4 EA: CPT | Performed by: PSYCHIATRY & NEUROLOGY

## 2022-08-29 PROCEDURE — 64642 CHEMODENERV 1 EXTREMITY 1-4: CPT | Performed by: PSYCHIATRY & NEUROLOGY

## 2022-08-29 NOTE — LETTER
8/29/2022    Patient: Celi Hartman   YOB: 1990   Date of Visit: 8/29/2022     Audrey Mcdaniel MD  [address]    Dear Audrey Mcdaniel MD,      Thank you for referring Ms. Celi Hartman to [address] [place] [address] for evaluation. My notes for this consultation are attached. If you have questions, please do not hesitate to call me. I look forward to following your patient along with you.       Sincerely,    Rex Chauhan, DO

## 2022-09-21 ENCOUNTER — TELEPHONE (OUTPATIENT)
Dept: NEUROLOGY | Age: 32
End: 2022-09-21

## 2022-09-22 ENCOUNTER — VIRTUAL VISIT (OUTPATIENT)
Dept: NEUROLOGY | Age: 32
End: 2022-09-22
Payer: COMMERCIAL

## 2022-09-22 ENCOUNTER — DOCUMENTATION ONLY (OUTPATIENT)
Dept: NEUROLOGY | Age: 32
End: 2022-09-22

## 2022-09-22 DIAGNOSIS — Z87.898 HISTORY OF AIRWAY ASPIRATION: ICD-10-CM

## 2022-09-22 DIAGNOSIS — G80.1 CEREBRAL PALSY WITH SPASTIC DIPLEGIA (HCC): ICD-10-CM

## 2022-09-22 DIAGNOSIS — R56.9 SEIZURE (HCC): ICD-10-CM

## 2022-09-22 DIAGNOSIS — F39 MOOD DISORDER (HCC): ICD-10-CM

## 2022-09-22 DIAGNOSIS — F81.9 MENTAL DEVELOPMENTAL DELAY: ICD-10-CM

## 2022-09-22 PROCEDURE — 99215 OFFICE O/P EST HI 40 MIN: CPT | Performed by: PSYCHIATRY & NEUROLOGY

## 2022-09-22 RX ORDER — DIVALPROEX SODIUM 125 MG/1
CAPSULE, COATED PELLETS ORAL
Qty: 720 CAPSULE | Refills: 3 | Status: SHIPPED | OUTPATIENT
Start: 2022-09-22

## 2022-09-22 NOTE — ASSESSMENT & PLAN NOTE
Historically followed at [facility]

## 2022-09-22 NOTE — PROGRESS NOTES
[address]  VV FOLLOW-UP VISIT     Dutch Aguilar is a 36 y.o. female who was seen by synchronous (real-time) audio-video technology on 9/22/2022. Dutch Aguilar is a 36 y.o. female who presents today for the following:  Chief Complaint   Patient presents with    Other     6 mo appt Cerebral palsy. Condition stable. ASSESSMENT AND PLAN  Patient is known to this practice. This is my first time seeing the patient. Chart and history reviewed in detail at today's office visit. 1. Seizure ([address]  Assessment & Plan:  Well-controlled on Depakote 125 mg taking 4 capsules twice a day new prescription was sent to her mail order pharmacy for 90-day supply and refills x1 year  Therapeutic lab work has been ordered   This will need to be a lifelong treatment for this patient    Orders:  -     divalproex (DEPAKOTE SPRINKLE) 125 mg capsule; TAKE 4 CAPSULES BY MOUTH TWICE DAILY. Indications: a type of seizure disorder called tonic-clonic epilepsy, Normal, Disp-720 Capsule, R-3  -     CBC WITH AUTOMATED DIFF; Future  -     METABOLIC PANEL, COMPREHENSIVE; Future  -     VALPROIC ACID; Future  -     AMMONIA; Future  2. Cerebral palsy with spastic diplegia (HCC)  Assessment & Plan:  Receiving Botox via Dr. Antonia Walden  Patient's mother reports progression over time  Reports some improvement with Botox in terms of general spasticity but cannot walk independently and needs assistance and can only walk short distances    Is been agreed to continue the Botox    We will start home PT and hopefully progress to outpatient physical therapy  Orders:  -     [facility]  3. Mental developmental delay  Assessment & Plan:   No major functional changes reported regarding baseline intellectual process  4. Mood disorder [facility])  Assessment & Plan:  Historically followed at [facility]    5.  History of airway aspiration  Assessment & Plan:   Stable without recurrence mother is very mindful regarding meals and eating with the patient      Patient and/or family was given time to ask questions and voice concerns. I believe all questions concerns were adequately addressed at this  office visit. Patient and/or family also verbalized agreement and understanding of the above-stated plan    Patient remains a complex patient secondary to polypharmacy, significant comorbid conditions, and use of high-risk medications which complicate the decision making process related to patient's neurologic diagnosis          ICD-10-CM ICD-9-CM    1. Seizure ([facility]  R56.9 780.39 divalproex (DEPAKOTE SPRINKLE) 125 mg capsule      CBC WITH AUTOMATED DIFF      METABOLIC PANEL, COMPREHENSIVE      VALPROIC ACID      AMMONIA      2. Cerebral palsy with spastic diplegia ([facility])  G80.1 343.0 REFERRAL TO HOME HEALTH      3. Mental developmental delay  F81.9 315.9       4. Mood disorder ([facility])  F39 296.90       5. History of airway aspiration  Z87.898 V49.89             I attest that 40 minutes was spent on today's visit reviewing medical records and diagnostic testing deemed pertinent to this patient's care, along with direct time spent at patient's visit including the history, physical assessment and plan, discussing diagnosis and management along with documentation.       HPI  Historical Data  Patient is known to the practice and was previously seen by multiple providers in the practice most recently Dr. Khushbu Hutton for Botox for spastic diplegia    Neurologic diagnosis  CP with spastic diplegia   Baseline: Nonverbal, incontinent bowel and bladder, can ambulate, needs some assistance with feeding mostly in preparation   History of aspiration January 2022   Receiving Botox with Dr. Khushbu Hutton       Seizure disorder   Onset: Age 5years of age   Seizure type: Generalized tonic-clonic    Loses consciousness, rigidity and clenching    There is gaze deviation to 1 side which would indicate possible focal onset   Medications tried    Dilantin    Topamax    Phenobarb    Lamotrigine    Tegretol    Other significant comorbid conditions/concerns  Patient is followed by pediatric psychiatry at [facility] Dr Edith Sánchez   She is followed for mood disorder with aggressive behaviors      Interim Data:     Seizure disorder   Generalized tonic-clonic seizures  Patient is here for follow-up for epilepsy. Current AEDs  Depakote 125 mg 4 capsules twice daily    Prior AEDs  Dilantin  Topamax  Phenobarb  Lamotrigine  Tegretol    Seizure events/last known seizure:  March 2018    Pt remains well controlled on current medications. There are no side effects reported    There is been no change in behavior    There is no evidence of depression related to AEDs    Patient is adherent to medication protocol    There are no issues related to malabsorption    There is no significant alcohol consumption    Driving:  Patient does not drive        CP with diplegia  Functionally worse related to ambulatory ability started about a year ago  Receiving Botox with some improvement but not back to baseline ambulatory issues  Was evaluated by orthopedics no significant pathology identified  Patient is predominantly bedbound per mother's report she is able to get up and walk short distances with assistance        History of aspiration  Swallowing studies as noted below  No repeat issue since January 2022          Pertinent diagnostic data    EXAM: XR SWALLOW FUN VIDEO included 1/24/2022  INDICATION: History of food bolus aspiration. Fluoroscopy dose (air kerma): 4.71 mGy. FINDINGS: Fluoroscopic assistance and image interpretation services were  provided to speech therapy for performance of a modified barium swallow. The  patient ingested liquid barium, barium mixed with applesauce and barium coated  crackers in the lateral position with continuous fluoroscopy. No evidence of  penetration or aspiration. Minimal residual vallecular residue.      IMPRESSION  Please refer to dedicated speech pathology report for details and  recommendations. No results found for this or any previous visit. No Known Allergies    Current Outpatient Medications   Medication Sig    divalproex (DEPAKOTE SPRINKLE) 125 mg capsule TAKE 4 CAPSULES BY MOUTH TWICE DAILY. 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 emg guided injections into lower extremities for spasticity    OTHER One stair lift    risperiDONE (RISPERDAL) 3 mg tablet Take 3 mg by mouth four (4) times daily. No current facility-administered medications for this visit. Past medical history/surgical history, family history, and social history have been reviewed for today's visit      ROS    A ten system review of constitutional, cardiovascular, respiratory, musculoskeletal, endocrine, skin, SHEENT, genitourinary, psychiatric and neurologic systems was obtained and is unremarkable except as mentioned under HPI          EXAMINATION: Within the context of a virtual visit    There were no vitals taken for this visit. General appearance: Patient is well-developed and well-nourished in no apparent distress and well groomed. Psych/mental health:  Affect: Appropriate    PHQ  3 most recent PHQ Screens 1/31/2022   PHQ Not Done Functional capacity motivation limits accuracy   Little interest or pleasure in doing things -   Feeling down, depressed, irritable, or hopeless -   Total Score PHQ 2 -       HEENT:   No gross abnormalities identified      Cardiovascular:   Not evaluated    Respiratory:   No obvious dyspnea or distress noted    Musculoskeletal:   Not tested    Integumentary:    Not evaluated      Neurological Examination:   Mental Status:        MMSE  No flowsheet data found.      Formal testing was not completed    History is obtained exclusively from the patient's mother  Patient was alert and looking around  Did not follow any commands  Did not appear agitated      Cranial Nerves:    Grossly intact    Motor:   Normal bulk  No tremor appreciated on today's exam  No abnormal movements appreciated on today's exam        Sensation: Not tested    Coordination/Cerebellar:   Good truncal stability identified    Gait: Patient is in bed and was not ambulated    Reflexes: Not tested    Fall risk assessment  No flowsheet data found. Follow-up and Dispositions    Return in about 6 months (around 3/22/2023) for Virtual visit. Due to this being a TeleHealth evaluation, many elements of the physical examination are unable to be assessed. Pursuant to the emergency declaration under the [address] [place] [address], [facility] waiver authority and the [employer] and [employer] Act, this Virtual  Visit was conducted, with patient's consent, to reduce the patient's risk of exposure to COVID-19 and provide continuity of care for an established patient. Services were provided through a video synchronous discussion virtually to substitute for in-person clinic visit.         Lacey Church MS, ANP-BC, [facility]

## 2022-09-22 NOTE — ASSESSMENT & PLAN NOTE
Well-controlled on Depakote 125 mg taking 4 capsules twice a day new prescription was sent to her mail order pharmacy for 90-day supply and refills x1 year  Therapeutic lab work has been ordered   This will need to be a lifelong treatment for this patient

## 2022-09-22 NOTE — ASSESSMENT & PLAN NOTE
Receiving Botox via Dr. Jayashree Garzon  Patient's mother reports progression over time  Reports some improvement with Botox in terms of general spasticity but cannot walk independently and needs assistance and can only walk short distances    Is been agreed to continue the Botox    We will start home PT and hopefully progress to outpatient physical therapy

## 2022-09-22 NOTE — PATIENT INSTRUCTIONS
As per discussion    Lab work has been ordered please go to [facility] or [address] [place] [address]    I have also [facility] for [facility] therapy and if they feel she needs any additional support services like speech or OT please reach out to me and I will add that to the order    I am hopeful that we can progress to doing physical therapy at a facility as they do have better equipment but I think just starting at home and moving forward would be beneficial    I will reach out to you by Charitasmartha regarding the results of the blood work and if we need to do anything differently based on those results        Office Policies      Appointments  Please make sure that you arrive for your next appointment at least 15 minutes prior to your appointment time. If for some reason you are going to be late please notify the office to determine if you need to be rescheduled or we can adjust your appointment time      Phone calls/patient messages:  Please allow up to 24 hours for someone in the office to contact you about your call or message. Be mindful your provider may be out of the office or your message may require further review. We encourage you to use [employer] for your messages as this is a faster, more efficient way to communicate with our office    Medication Refills:  Prescription medications require up to 48 business hours to process. We encourage you to use [employer] for your refills. For controlled medications: Please allow up to 72 business hours to process. Certain medications may require you to  a written prescription at our office. NO narcotic/controlled medications will be prescribed after 4pm Monday through Friday or on weekends    Form/Paperwork Completion:  We ask that you allow 7-14 business days. You may also download your forms to [employer] to have your doctor print off.

## 2022-09-22 NOTE — TELEPHONE ENCOUNTER
Called and spoke with Nicole Lott @ [facility]. [facility] scheduled to deliver 9/23/2022.

## 2022-09-23 ENCOUNTER — DOCUMENTATION ONLY (OUTPATIENT)
Dept: NEUROLOGY | Age: 32
End: 2022-09-23

## 2022-09-23 NOTE — PROGRESS NOTES
Received Dysport from [facility] for 11/21 appointment  : Barak Mixon  Lot number [id number]  Expiration date: 5/31/2023  [facility] number: [phone]

## 2022-10-18 NOTE — TELEPHONE ENCOUNTER
Last office visit   [facility] with [facility] day supply and 3 refills

## 2022-11-17 ENCOUNTER — TELEPHONE (OUTPATIENT)
Dept: NEUROLOGY | Age: 32
End: 2022-11-17

## 2022-11-17 NOTE — TELEPHONE ENCOUNTER
Called pt, spoke to mom [place] allis on [facility], she advised that the holidays are posing a problem to get her Botox, so they had to cancel her appt on 11/21/22. Advised he is booking out on his procedures but I would look. Looked at schedule while Darin charlesellen was on the phone with me, advised 2/17/23 is the first Botox day we have. She advised that would be great. Verified 2/17/23 at 3:30 pm for Botox. Darin jeff verbalized understanding.

## 2022-11-17 NOTE — TELEPHONE ENCOUNTER
Received Hello message, \"Please call re: need to cancel and reschedule 11/21 appt. \"      [phone]

## 2023-01-10 ENCOUNTER — TELEPHONE (OUTPATIENT)
Dept: NEUROLOGY | Age: 33
End: 2023-01-10

## 2023-01-10 NOTE — TELEPHONE ENCOUNTER
Dysport 500 units sent to [address] (as before) via [facility] for her spastic displegia (Cerebral Palsy)     Key  [id number]  PENDING     [address] has not yet replied to your PA request. You may close this dialog, return to your dashboard, and perform other tasks. To check for an update later, open this request again from your dashboard. If [facility] has not replied to your request within 24 hours please contact [address] at [phone].

## 2023-01-12 ENCOUNTER — TELEPHONE (OUTPATIENT)
Dept: NEUROLOGY | Age: 33
End: 2023-01-12

## 2023-01-12 NOTE — TELEPHONE ENCOUNTER
Dysport approval letter - from [address] [place] [address] [place]   # [id number]  Scanned to chart. Approved from 23 for 6 months to  7/10/23. It requires to be filled at [address] Fyi to [employer]

## 2023-02-14 ENCOUNTER — VIRTUAL VISIT (OUTPATIENT)
Dept: FAMILY MEDICINE CLINIC | Age: 33
End: 2023-02-14
Payer: COMMERCIAL

## 2023-02-14 DIAGNOSIS — J06.9 UPPER RESPIRATORY TRACT INFECTION, UNSPECIFIED TYPE: Primary | ICD-10-CM

## 2023-02-14 PROCEDURE — 99213 OFFICE O/P EST LOW 20 MIN: CPT | Performed by: FAMILY MEDICINE

## 2023-02-14 NOTE — PROGRESS NOTES
Ld Bradford, was evaluated through a synchronous (real-time) audio-video encounter. The patient (or guardian if applicable) is aware that this is a billable service, which includes applicable co-pays. This Virtual Visit was conducted with patient's (and/or legal guardian's) consent. The visit was conducted pursuant to the emergency declaration under the [facility] [address] and the [employer] General Act. Patient identification was verified, and a caregiver was present when appropriate. The patient was located at: Home: [phone]  The provider was located at: Facility (Appt Department): [address] [place] [address] MD [place]    Subjective:   Ld Bradford is a 29 y.o. F who was seen for: mother is present    Patient has had URI symptoms for the past week. Overall getting better but still having quite a bit of nasal congestion. There is also some dry skin around her eyelids which mother is applying Neosporin ointment to. Denies any fevers, shortness of breath, eye discharge, or eye redness. Current Outpatient Medications:     divalproex (DEPAKOTE SPRINKLE) 125 mg capsule, TAKE 4 CAPSULES BY MOUTH TWICE DAILY. 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 emg guided injections into lower extremities for spasticity, Disp: 1 Each, Rfl: 3    OTHER, One stair lift, Disp: 1 Device, Rfl: 0    risperiDONE (RISPERDAL) 3 mg tablet, Take 3 mg by mouth four (4) times daily. , Disp: , Rfl:     No Known Allergies    Review of Systems   Unable to perform ROS: Patient nonverbal       Objective:     Patient-Reported Vitals 2/13/2023   Patient-Reported Weight 130   Patient-Reported Height -   Patient-Reported Pulse 119   Patient-Reported Temperature 97.7   Patient-Reported SpO2 N/A   Patient-Reported Systolic  739   Patient-Reported Diastolic 96   Patient-Reported Peak Flow N/A   Patient-Reported LMP 1/9/23        Constitutional: [x] Appears well-developed and well-nourished [x] No apparent distress      [] Abnormal -     Mental status: [x] Alert and awake  [x] Oriented to person/place/time [x] Able to follow commands    [] Abnormal -     Eyes:   EOM    [x]  Normal    [] Abnormal -   Sclera  [x]  Normal    [] Abnormal -          Discharge []  None visible   [] Abnormal -     HENT: [x] Normocephalic, atraumatic  [] Abnormal -   [] Mouth/Throat: Mucous membranes are moist    Neck: [x] No visualized mass [] Abnormal -     Pulmonary/Chest: [x] Respiratory effort normal   [x] No visualized signs of difficulty breathing or respiratory distress        [] Abnormal -         Skin:        [x] No significant exanthematous lesions or discoloration noted on facial skin         [] Abnormal -            Psychiatric:       [x] Normal Affect [] Abnormal -        [x] No Hallucinations    Other pertinent observable physical exam findings:    Assessment & Plan:   Cinthya Song is a 29 y.o. female who presents today for:    1. Upper respiratory tract infection, unspecified type  Clinically improving; likely viral. Can use nasal saline spray with suction bulb and humidifier. Would use Vaseline for dry skin, not neosporin. There are no discontinued medications. Treatment risks/benefits/costs/interactions/alternatives discussed with patient. Advised patient to call back or return to office if symptoms worsen/change/persist. If patient cannot reach us or should anything more severe/urgent arise he/she should proceed directly to the nearest emergency department. Discussed expected course/resolution/complications of diagnosis in detail with patient. Patient expressed understanding with the diagnosis and plan.      This dictation may have been completed with Dragon, the computer voice recognition software. Unanticipated grammatical, syntax, homophones, and other interpretive errors are sometimes inadvertently transcribed by the computer software. Please disregard any errors that have escaped final proofreading. Liat David M.D.

## 2023-04-26 ENCOUNTER — VIRTUAL VISIT (OUTPATIENT)
Dept: FAMILY MEDICINE CLINIC | Age: 33
End: 2023-04-26
Payer: COMMERCIAL

## 2023-04-26 DIAGNOSIS — U07.1 COVID-19: Primary | ICD-10-CM

## 2023-04-26 DIAGNOSIS — Z87.01 HISTORY OF ASPIRATION PNEUMONIA: ICD-10-CM

## 2023-04-26 DIAGNOSIS — J06.9 UPPER RESPIRATORY TRACT INFECTION, UNSPECIFIED TYPE: ICD-10-CM

## 2023-04-26 DIAGNOSIS — Z20.822 EXPOSURE TO COVID-19 VIRUS: ICD-10-CM

## 2023-04-26 DIAGNOSIS — R56.9 SEIZURE (HCC): ICD-10-CM

## 2023-04-26 DIAGNOSIS — G80.9 CEREBRAL PALSY, UNSPECIFIED TYPE (HCC): ICD-10-CM

## 2023-04-26 PROCEDURE — 99214 OFFICE O/P EST MOD 30 MIN: CPT | Performed by: STUDENT IN AN ORGANIZED HEALTH CARE EDUCATION/TRAINING PROGRAM

## 2023-05-25 ENCOUNTER — PATIENT MESSAGE (OUTPATIENT)
Age: 33
End: 2023-05-25

## 2023-05-26 RX ORDER — KETOCONAZOLE 20 MG/ML
SHAMPOO TOPICAL
Qty: 120 ML | Refills: 0 | Status: SHIPPED | OUTPATIENT
Start: 2023-05-26

## 2023-06-20 RX ORDER — KETOCONAZOLE 20 MG/ML
SHAMPOO TOPICAL
Qty: 120 ML | Refills: 0 | Status: SHIPPED | OUTPATIENT
Start: 2023-06-20

## 2023-06-20 NOTE — TELEPHONE ENCOUNTER
PCP: Elida Baig MD    Last appt: 4/26/2023       Future Appointments   Date Time Provider Lola Bullard   9/1/2023 10:00 AM [facility]       Requested Prescriptions     Pending Prescriptions Disp Refills    ketoconazole (NIZORAL) 2 % shampoo [Pharmacy Med Name: Ketoconazole External Shampoo 2 %] 120 mL 0     Sig: apply 5-10 ml to wet scalp, lather, leave on 3-5 mins and rinse.  apply twice weekly for 2-4 weeks as needed       Prior labs and Blood pressures:  BP Readings from Last 3 Encounters:   04/26/21 125/84     Lab Results   Component Value Date/Time     01/25/2022 02:55 AM    K 3.6 01/25/2022 02:55 AM     01/25/2022 02:55 AM    CO2 26 01/25/2022 02:55 AM    BUN 8 01/25/2022 02:55 AM    GFRAA >60 01/25/2022 02:55 AM     No results found for: HBA1C, ERF2YOSS  No results found for: CHOL, CHOLPOCT, CHOLX, CHLST, CHOLV, HDL, HDLPOC, HDLC, LDL, LDLC, VLDLC, VLDL, TGLX, TRIGL  No results found for: VITD3, VD3RIA    No results found for: TSH, TSH2, TSH3

## 2023-07-05 ENCOUNTER — TELEPHONE (OUTPATIENT)
Age: 33
End: 2023-07-05

## 2023-07-05 NOTE — TELEPHONE ENCOUNTER
----- Message from Kevin Wyatt sent at 7/5/2023 12:46 PM EDT -----  Subject: Message to Provider    QUESTIONS  Information for Provider? Please contact Legal guardian Ninoska Foote would like   to request call from office she would like information on which provider   is listed in network for insurance purposes. Please contact to advise.   ---------------------------------------------------------------------------  --------------  Emerald SHAW  [phone]; OK to leave message on voicemail  ---------------------------------------------------------------------------  --------------  SCRIPT ANSWERS  Relationship to Patient? Covered Entity  Covered Entity Type? Other  Other Covered Entity Type?  [address]  Representative Name? Aliya Landa Subjective   Patient ID: Gaviota is a 19 year old female who is accompanied by:no one, patient is here by herself.    Well Child Assessment:  History provided by: Patient. Gaviota lives with her mother.   Nutrition  Types of intake include fish, fruits and vegetables. Junk food includes fast food and sugary drinks.   Dental  The patient has a dental home. The patient brushes teeth regularly. The patient does not floss regularly. Last dental exam was more than a year ago.   Elimination  Elimination problems do not include constipation or diarrhea.   Sleep  Average sleep duration (hrs): 11:30-9:00am. The patient does not snore. There are no sleep problems.   Safety  There is no smoking in the home. Home has working smoke alarms? yes. Home has working carbon monoxide alarms? yes.   School  Current grade level is 12th. There are no signs of learning disabilities. Child is doing well in school.       Playing volleyball and working at [employer] this summer     Interval Events   Recent ER Visits: COVID in 2021 and February 2022   Recent Urgent Care Visits: None  Recent Hospitalizations: None  Recent visits to subspecialists: None  Current Medications: None  Allergies: Cheese, pollen, flouride treatment at dentist   PMH: None  Last saw regular pediatrician: couple of years     Additional concerns today: None     Review of Systems   Constitutional: Negative.    HENT: Negative.    Eyes: Negative.    Respiratory: Negative.  Negative for snoring.    Gastrointestinal: Negative.  Negative for constipation and diarrhea.   Skin: Negative.    Allergic/Immunologic: Positive for environmental allergies.   Psychiatric/Behavioral: Negative for sleep disturbance.       Patient's medications, allergies, past medical, surgical, social and family histories were reviewed and updated as appropriate.    Objective   Vitals: /82   Pulse 84   Temp 98.3 °F (36.8 °C)   Ht 5' 6\" (1.676 m)   Wt (!) 117.3 kg (258 lb 9.6 oz)   BMI 41.74 kg/m²    BSA 2.23 m²   Growth parameters are noted and are appropriate for age.    Physical Exam  Vitals and nursing note reviewed. Exam conducted with a chaperone present.   Constitutional:       General: She is not in acute distress.     Appearance: She is obese. She is not ill-appearing.   HENT:      Head: Normocephalic and atraumatic.      Right Ear: Tympanic membrane, ear canal and external ear normal. There is no impacted cerumen.      Left Ear: Tympanic membrane, ear canal and external ear normal. There is no impacted cerumen.      Nose: Nose normal.      Mouth/Throat:      Mouth: Mucous membranes are moist.      Pharynx: Oropharynx is clear.      Neck: Normal range of motion. No tenderness.   Eyes:      General:         Right eye: No discharge.         Left eye: No discharge.      Extraocular Movements: Extraocular movements intact.      Conjunctiva/sclera: Conjunctivae normal.      Pupils: Pupils are equal, round, and reactive to light.   Cardiovascular:      Rate and Rhythm: Normal rate and regular rhythm.      Pulses: Normal pulses.      Heart sounds: Normal heart sounds. No murmur heard.  Pulmonary:      Effort: Pulmonary effort is normal.      Breath sounds: Normal breath sounds.   Abdominal:      General: Abdomen is flat. There is no distension.      Palpations: Abdomen is soft.      Tenderness: There is no abdominal tenderness.   Musculoskeletal:         General: Normal range of motion.   Lymphadenopathy:      Cervical: No cervical adenopathy.   Skin:     General: Skin is warm and dry.      Capillary Refill: Capillary refill takes less than 2 seconds.      Findings: No rash.   Neurological:      General: No focal deficit present.      Mental Status: She is alert and oriented to person, place, and time.   Psychiatric:         Mood and Affect: Mood normal.         Behavior: Behavior normal.         Thought Content: Thought content normal.         Judgment: Judgment normal.       Assessment   Problem List  Items Addressed This Visit    None     Visit Diagnoses     Well adolescent visit    -  Primary    Need for vaccination        Relevant Orders    MENINGOCOCCAL CONJUGATE MCV4O VACC (MENVEO) (Completed)           Gaviota Small is a 19 year old 6 month old obese female presenting for the 19 year health maintenance visit.     HMV  - Age appropriate anticipatory guidance discussed  - Vaccines due at today's visit: Meningococcal  - School form provided (See answers to TB questionnaire in Screenings Tab)  - Urged patient and caregiver to begin looking for an adult provider as patient will be aging out of the pediatric office at age 20 years.       Counseling  Nutrition/Weight Management:  Assessment and discussion of current Nutrition behaviors performed:  Yes  Assessment and discussion of current Physical Activity behaviors performed: Yes    Immunizations: per orders.  History of previous adverse reactions to immunizations? No  Immunizations given today: Yes - Immunizations given today and vaccine counseling including benefits, risks, and adverse reactions were provided by myself during the visit.    Follow-up yearly for a well visit, or sooner as needed.    Mayte Samuel D.O.   Pediatrics PGY-2  Contact via [employer]

## 2023-07-05 NOTE — TELEPHONE ENCOUNTER
Called Patient mother Eros Jaramillo as per PHI. Name and  verified. Informed her that her PCP is Dr. Liv Hodge. She stated that dr. Vero Rucker name is not in the network. Informed dr. Martínez Daly name for insurance purpose and last visit with dr. Martínez Daly in April. She stated understanding.

## 2023-07-24 NOTE — TELEPHONE ENCOUNTER
PCP: Baljit Lisa MD    Last appt: 4/26/2023       Future Appointments   Date Time Provider [address]   9/1/2023 10:00 AM DO YUE NelsonMRSPB BS AMB       Requested Prescriptions     Pending Prescriptions Disp Refills    ketoconazole (NIZORAL) 2 % shampoo [Pharmacy Med Name: Ketoconazole External Shampoo 2 %] 120 mL 0     Sig: APPLY 5-10 ML TO WET SCALP, LATHER, LEAVE ON FOR 3-5 MINUTES AND RINSE.  APPLY TWICE WEEKLY FOR 2-4 WEEKS AS NEEDED       Prior labs and Blood pressures:  BP Readings from Last 3 Encounters:   04/26/21 125/84     Lab Results   Component Value Date/Time     01/25/2022 02:55 AM    K 3.6 01/25/2022 02:55 AM     01/25/2022 02:55 AM    CO2 26 01/25/2022 02:55 AM    BUN 8 01/25/2022 02:55 AM    GFRAA >60 01/25/2022 02:55 AM     No results found for: HBA1C, AXH1ZRCN  No results found for: CHOL, CHOLPOCT, CHOLX, CHLST, CHOLV, HDL, HDLPOC, HDLC, LDL, LDLC, VLDLC, VLDL, TGLX, TRIGL  No results found for: VITD3, VD3RIA    No results found for: TSH, TSH2, TSH3

## 2023-07-25 RX ORDER — KETOCONAZOLE 20 MG/ML
SHAMPOO TOPICAL
Qty: 120 ML | Refills: 0 | Status: SHIPPED | OUTPATIENT
Start: 2023-07-25

## 2023-08-30 RX ORDER — KETOCONAZOLE 20 MG/ML
SHAMPOO TOPICAL
Qty: 120 ML | Refills: 0 | OUTPATIENT
Start: 2023-08-30

## 2023-08-30 NOTE — TELEPHONE ENCOUNTER
PCP: Paul Olivier MD    Last appt: 4/26/2023       Future Appointments   Date Time Provider [address]   9/1/2023 10:00 AM DO HUMZA Nelson BS AMB       Requested Prescriptions     Pending Prescriptions Disp Refills    ketoconazole (NIZORAL) 2 % shampoo [Pharmacy Med Name: Ketoconazole External Shampoo 2 %] 120 mL 0     Sig: APPLY 5-10ML TO WET SCALP, LATHER, LEAVE ON FOR 3-5 MINUTES AND RINSE. APPLY TWICE WEEKLY FOR 2-4 WEEKS AS NEEDED.        Prior labs and Blood pressures:  BP Readings from Last 3 Encounters:   04/26/21 125/84     Lab Results   Component Value Date/Time     01/25/2022 02:55 AM    K 3.6 01/25/2022 02:55 AM     01/25/2022 02:55 AM    CO2 26 01/25/2022 02:55 AM    BUN 8 01/25/2022 02:55 AM    GFRAA >60 01/25/2022 02:55 AM     No results found for: HBA1C, TCQ6WGWP  No results found for: CHOL, CHOLPOCT, CHOLX, CHLST, CHOLV, HDL, HDLPOC, HDLC, LDL, LDLC, VLDLC, VLDL, TGLX, TRIGL  No results found for: VITD3, VD3RIA    No results found for: TSH, TSH2, TSH3

## 2023-08-30 NOTE — TELEPHONE ENCOUNTER
Called Patient. Spoke with patient's mom. Name and  confirmed. She stated that she has some left. She does not need refill on that.

## 2023-09-01 ENCOUNTER — TELEMEDICINE (OUTPATIENT)
Age: 33
End: 2023-09-01
Payer: COMMERCIAL

## 2023-09-01 DIAGNOSIS — R56.9 SEIZURES (HCC): Primary | ICD-10-CM

## 2023-09-01 PROCEDURE — 99215 OFFICE O/P EST HI 40 MIN: CPT | Performed by: INTERNAL MEDICINE

## 2023-09-01 RX ORDER — DIPHENHYDRAMINE HCL 25 MG
25 TABLET ORAL NIGHTLY
COMMUNITY

## 2023-09-01 RX ORDER — DIVALPROEX SODIUM 125 MG/1
CAPSULE, COATED PELLETS ORAL
Qty: 60 CAPSULE | Refills: 3 | Status: SHIPPED | OUTPATIENT
Start: 2023-09-01

## 2023-09-01 NOTE — PROGRESS NOTES
Chief Complaint   Patient presents with    Seizures     Denies seizures since last visit      1. Have you been to the ER, urgent care clinic since your last visit? Hospitalized since your last visit? No     2. Have you seen or consulted any other health care providers outside of the [address] [place] [address] Avenue since your last visit? Include any pap smears or colon screening.  No     No VS to report

## 2023-09-01 NOTE — PROGRESS NOTES
Neurology Note    Patient ID:  Vincent Doan  [id number]  85 y.o.  1990      Date of Consultation:  September 1, 2023    Vincent Doan, was evaluated through a synchronous (real-time) audio-video encounter. The patient (or guardian if applicable) is aware that this is a billable service, which includes applicable co-pays. This Virtual Visit was conducted with patient's (and/or legal guardian's) consent. Patient identification was verified, and a caregiver was present when appropriate. The patient was located at Home: [id number] [place] [address]  Provider was located at [facility] (Appt Dept): [address] [facility]  [address]         Total time spent for this encounter:  60 minutes     --Andres Zapata DO on 9/1/2023 at 9:18 AM    An electronic signature was used to authenticate this note. This is a telemedicine visit that was performed with in the originating site at patient's home and the distance site at [facility] outpatient clinic at [facility].  This telemedicine visit utilized synchronous (real-time) audio-video technology. Verbal consent to participate in the video visit was obtained. This visit occurred during the corona (COVID -19) public health emergency. I discussed with the patient the nature of our telemedicine visit, that:  - I would evaluate the patient and recommend diagnostic and treatment based on my assessment  - Our sessions are not being recorded and that personal health information is protected  - Our team will provide follow-up care in person if and when the patient needs it. Consent:  The patient is aware that this patient-initiated Telehealth encounter is a billable service, with coverage as determined by the patient's insurance carrier.  The patient is aware that they may receive a bill and has provided verbal consent to proceed:     Assessment and Plan:  63-year-old female with history of seizures on Depakote,

## 2024-02-20 DIAGNOSIS — F39 UNSPECIFIED MOOD (AFFECTIVE) DISORDER (HCC): Primary | ICD-10-CM

## 2024-02-20 RX ORDER — RISPERIDONE 3 MG/1
TABLET, ORALLY DISINTEGRATING ORAL
Qty: 150 TABLET | Refills: 0 | Status: SHIPPED | OUTPATIENT
Start: 2024-02-20

## 2024-03-04 ENCOUNTER — TELEMEDICINE (OUTPATIENT)
Age: 34
End: 2024-03-04
Payer: COMMERCIAL

## 2024-03-04 DIAGNOSIS — R56.9 SEIZURES (HCC): Primary | ICD-10-CM

## 2024-03-04 DIAGNOSIS — Z51.81 ENCOUNTER FOR THERAPEUTIC DRUG LEVEL MONITORING: ICD-10-CM

## 2024-03-04 DIAGNOSIS — F81.9 MENTAL DEVELOPMENTAL DELAY: ICD-10-CM

## 2024-03-04 DIAGNOSIS — G80.1 CEREBRAL PALSY WITH SPASTIC DIPLEGIA (HCC): ICD-10-CM

## 2024-03-04 DIAGNOSIS — F39 MOOD DISORDER (HCC): ICD-10-CM

## 2024-03-04 PROCEDURE — 99214 OFFICE O/P EST MOD 30 MIN: CPT | Performed by: PSYCHIATRY & NEUROLOGY

## 2024-03-04 RX ORDER — DIVALPROEX SODIUM 125 MG/1
CAPSULE, COATED PELLETS ORAL
Qty: 240 CAPSULE | Refills: 11 | Status: SHIPPED | OUTPATIENT
Start: 2024-03-04

## 2024-03-04 ASSESSMENT — PATIENT HEALTH QUESTIONNAIRE - PHQ9
SUM OF ALL RESPONSES TO PHQ QUESTIONS 1-9: 0
1. LITTLE INTEREST OR PLEASURE IN DOING THINGS: 0
SUM OF ALL RESPONSES TO PHQ QUESTIONS 1-9: 0
2. FEELING DOWN, DEPRESSED OR HOPELESS: 0
SUM OF ALL RESPONSES TO PHQ QUESTIONS 1-9: 0
SUM OF ALL RESPONSES TO PHQ9 QUESTIONS 1 & 2: 0
SUM OF ALL RESPONSES TO PHQ QUESTIONS 1-9: 0

## 2024-03-04 NOTE — PATIENT INSTRUCTIONS
As per discussion  We have made a referral to [facility] home health for evaluation of strength gait and balance retraining    A new prescription for the Depakote was sent to the local pharmacy  We will continue to renew the risperidone at this time but I would recommend getting reestablished with a psychiatrist for adult patients with cognitive delay and disability.  Even though she is stable now if her behavioral issues are not easily managed or change over time which can happen they are the specialist and managing medications  But we will continue to renew the risperidone at this point until you can get reestablished into the mental health arena    Will see you back in 6 months virtually just to check in and see how things are going        Office Policies    Phone calls/patient messages:  Please allow up to 24 hours for someone in the office to contact you about your call or message. Be mindful your provider may be out of the office or your message may require further review. We encourage you to use [employer] for your messages as this is a faster, more efficient way to communicate with our office    Medication Refills:  Prescription medications require up to 48 business hours to process. We encourage you to use [employer] for your refills.     For controlled medications: Please allow up to 72 business hours to process. Certain medications may require you to  a written prescription at our office.    NO narcotic/controlled medications will be prescribed after 4pm Monday through Friday or on weekends    Form/Paperwork Completion:  We ask that you allow 7-14 business days. You may also download your forms to [employer] to have your doctor print off.       .

## 2024-03-04 NOTE — PROGRESS NOTES
[facility]  [facility]  [address]  [place] [address]  Phone: [phone] fax: [phone]      Katherin Whitehead, was evaluated through a synchronous (real-time) audio-video encounter. The patient (or guardian if applicable) is aware that this is a billable service, which includes applicable co-pays. This Virtual Visit was conducted with patient's (and/or legal guardian's) consent. Patient identification was verified, and a caregiver was present when appropriate.   The patient was located at Home: [address] [place] [address]  Provider was located at Facility (Appt Dept): [address]  [place] [address]      Katherin Whitehead (:  1990) is a Established patient, presenting virtually for evaluation of the following:    Assessment & Plan   Below is the assessment and plan developed based on review of pertinent history, physical exam, labs, studies, and medications.  1. Seizures (HCC)  Assessment & Plan:  Patient remains seizure-free continues on Depakote 125 mg capsules 4 tablets twice a day  She is overdue for therapeutic monitoring labs have been ordered   Orders:  -     divalproex (DEPAKOTE SPRINKLE) 125 MG DR capsule; TAKE 4 CAPSULES BY MOUTH TWICE DAILY.  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 Mood disorder (HCC)  Assessment & Plan:  Patient tends to have behavioral issues.  [facility] but because of her age was discharged from service and has not gotten under the care of a adult neurologist.     When she was off the risperidone behavior accelerated I have taken over the prescription until they can get reestablished with a mental health team for medication prescription and monitoring over time  For now she

## 2024-03-04 NOTE — ASSESSMENT & PLAN NOTE
Patient remains seizure-free continues on Depakote 125 mg capsules 4 tablets twice a day  She is overdue for therapeutic monitoring labs have been ordered    Please send a note for pt the days he wants off

## 2024-03-04 NOTE — ASSESSMENT & PLAN NOTE
Botox was not effective for the patient.   About a year ago she was ambulating  Reasonable to have home health going to evaluate for gait and balance retraining to see if we can improve any independence.  Her mother has realistic expectations but I do think it is reasonable to have the evaluation and try to maximize the patient's functional ability  Home physical therapy has been ordered

## 2024-03-04 NOTE — ASSESSMENT & PLAN NOTE
Patient remains total care  Reasonable to evaluate with home physical therapy to see if we can improve functional status  She will need 24/7 supervision long-term

## 2024-03-04 NOTE — PROGRESS NOTES
Patient identified with two identification factors, Name and Date of Birth.    Chief Complaint   Patient presents with    Follow-up       There were no vitals taken for this visit.      1. \"Have you been to the ER, urgent care clinic since your last visit?  Hospitalized since your last visit?\" No     2. \"Have you seen or consulted any other health care providers outside of the [facility] since your last visit?\" No

## 2024-03-04 NOTE — ASSESSMENT & PLAN NOTE
Patient tends to have behavioral issues.  She previously was followed at [facility] but because of her age was discharged from service and has not gotten under the care of a adult neurologist.     When she was off the risperidone behavior accelerated I have taken over the prescription until they can get reestablished with a mental health team for medication prescription and monitoring over time  For now she remains on risperidone 3 mg melt tabs she generally takes 2 at night she can have 2 during the day if needed and an additional 1 tablet every 4 hours as needed  But generally speaking her mother states she only uses the 2 tablets at night

## 2024-03-07 ENCOUNTER — TELEPHONE (OUTPATIENT)
Age: 34
End: 2024-03-07

## 2024-03-07 NOTE — TELEPHONE ENCOUNTER
Mary with [facility] states they will not be able to see the patient in the 48 hours time frame, and they just wanted the provider to be aware of this. If there are any problems with this, please call her back at . Thank you.

## 2024-03-13 ENCOUNTER — TELEPHONE (OUTPATIENT)
Age: 34
End: 2024-03-13

## 2024-03-13 NOTE — TELEPHONE ENCOUNTER
----- Message from Radha Hobson sent at 3/11/2024  1:56 PM EDT -----  Subject: Appointment Request    Reason for Call: Established Patient Appointment needed: Routine Physical   Exam    QUESTIONS    Reason for appointment request? No appointments available during search     Additional Information for Provider? Pt needs a general physical   scheduled. No appts available. Her insurance company is stating in order   to keep her medical necessity needs, they need updated physical and paper   work filled out. please contact mom at number below. If no appt made,   supplies will stop. thank you   ---------------------------------------------------------------------------  --------------  CALL BACK INFO  [phone]; OK to leave message on voicemail  ---------------------------------------------------------------------------  --------------  SCRIPT ANSWERS

## 2024-03-13 NOTE — TELEPHONE ENCOUNTER
Spoke to pt's mom (Maylin) on PHI informed her that  need's to see the pt before she can fill out the Home Care Delivery Form's.(Cristina) voiced understanding pt is scheduled to see  on 4/1/24 @ 3:00 PM.

## 2024-03-17 DIAGNOSIS — F39 UNSPECIFIED MOOD (AFFECTIVE) DISORDER (HCC): ICD-10-CM

## 2024-03-18 RX ORDER — RISPERIDONE 3 MG/1
TABLET, ORALLY DISINTEGRATING ORAL
Qty: 150 TABLET | Refills: 0 | Status: SHIPPED | OUTPATIENT
Start: 2024-03-18

## 2024-03-20 ENCOUNTER — TELEPHONE (OUTPATIENT)
Age: 34
End: 2024-03-20

## 2024-03-20 NOTE — TELEPHONE ENCOUNTER
April/[facility] would like a copy of patient office notes with the date range of 12/12/2023 documenting pt diagnosis. Please fax to [phone]

## 2024-03-29 SDOH — ECONOMIC STABILITY: FOOD INSECURITY: WITHIN THE PAST 12 MONTHS, YOU WORRIED THAT YOUR FOOD WOULD RUN OUT BEFORE YOU GOT MONEY TO BUY MORE.: NEVER TRUE

## 2024-03-29 SDOH — ECONOMIC STABILITY: INCOME INSECURITY: HOW HARD IS IT FOR YOU TO PAY FOR THE VERY BASICS LIKE FOOD, HOUSING, MEDICAL CARE, AND HEATING?: NOT HARD AT ALL

## 2024-03-29 SDOH — ECONOMIC STABILITY: FOOD INSECURITY: WITHIN THE PAST 12 MONTHS, THE FOOD YOU BOUGHT JUST DIDN'T LAST AND YOU DIDN'T HAVE MONEY TO GET MORE.: NEVER TRUE

## 2024-03-29 SDOH — ECONOMIC STABILITY: TRANSPORTATION INSECURITY
IN THE PAST 12 MONTHS, HAS LACK OF TRANSPORTATION KEPT YOU FROM MEETINGS, WORK, OR FROM GETTING THINGS NEEDED FOR DAILY LIVING?: NO

## 2024-03-29 SDOH — ECONOMIC STABILITY: HOUSING INSECURITY
IN THE LAST 12 MONTHS, WAS THERE A TIME WHEN YOU DID NOT HAVE A STEADY PLACE TO SLEEP OR SLEPT IN A SHELTER (INCLUDING NOW)?: NO

## 2024-04-01 ENCOUNTER — OFFICE VISIT (OUTPATIENT)
Age: 34
End: 2024-04-01
Payer: COMMERCIAL

## 2024-04-01 VITALS — HEIGHT: 62 IN | BODY MASS INDEX: 20.74 KG/M2 | TEMPERATURE: 98.4 F

## 2024-04-01 DIAGNOSIS — G80.1 CEREBRAL PALSY WITH SPASTIC DIPLEGIA (HCC): Primary | ICD-10-CM

## 2024-04-01 DIAGNOSIS — Z99.3 WHEELCHAIR DEPENDENT: ICD-10-CM

## 2024-04-01 PROCEDURE — 99213 OFFICE O/P EST LOW 20 MIN: CPT | Performed by: FAMILY MEDICINE

## 2024-04-01 NOTE — PROGRESS NOTES
Chief Complaint   Patient presents with    Follow-up     Cerebral Palsy     \"Have you been to the ER, urgent care clinic since your last visit?  Hospitalized since your last visit?\"    NO    “Have you seen or consulted any other health care providers outside of [facility] since your last visit?”    NO        “Have you had a pap smear?”    NO    No cervical cancer screening on file                 3/4/2024    10:15 AM   PHQ-9    Little interest or pleasure in doing things 0   Feeling down, depressed, or hopeless 0   PHQ-2 Score 0   PHQ-9 Total Score 0           Financial Resource Strain: Not on file      Food Insecurity: Not on file (3/29/2024)          Health Maintenance Due   Topic Date Due    Hepatitis B vaccine (1 of 3 - 3-dose series) Never done    COVID-19 Vaccine (1) Never done    Varicella vaccine (1 of 2 - 2-dose childhood series) Never done    HIV screen  Never done    Hepatitis C screen  Never done    DTaP/Tdap/Td vaccine (1 - Tdap) Never done    Cervical cancer screen  Never done

## 2024-04-02 ENCOUNTER — TELEPHONE (OUTPATIENT)
Facility: CLINIC | Age: 34
End: 2024-04-02

## 2024-04-02 NOTE — TELEPHONE ENCOUNTER
[facility] (PC)   (formerly [facility])   Phone:  [phone]      Fax:  [phone] [address], [address]  [place] [address]    Name:  Katherin Whitehead  YOB: 1990    Received Primary Care at Home referral for Katherin Whitehead from Dr. Karthik Martinez.  This nurse called and spoke with patient's mother Maylin Whitehead and explained [facility] services.    Patient's most recent office visit with Dr. Martinez was on 4/1/24.    This nurse explained that the Primary Care at Home team discusses new referrals at our weekly IDG meetings.  The next meeting is scheduled for 4/9/24.  This nurse will present the referral to the Primary Care at Home team on 4/9/24 and nurse or SW will call Ms. Whitehead back to notify her of the decision.      Zuleika Velarde RN, Gerontological Nursing-BC, CHPN

## 2024-04-09 ENCOUNTER — TELEPHONE (OUTPATIENT)
Facility: CLINIC | Age: 34
End: 2024-04-09

## 2024-04-09 NOTE — TELEPHONE ENCOUNTER
[facility] Primary [facility] at Home   (formerly [facility])   Phone:  [phone]      Fax:  [phone]  [facility], [address], [address]  [place] [address]    Name:  Katherin Whitehead  YOB: 1990    The Primary Care at Home team discussed Katherin Whitehead's referral in the weekly IDG meeting on 4/9/24 and patient was approved for Primary Care at Home services.      This nurse called and spoke with patient's mother Maylin Whitehead and informed her that the start of care date will be 5/24/24 with Gracie Yang NP.  Olive Torres LCSW will be contacting her to set up an appointment for initial SW assessment and to get new patient paperwork signed prior to Gracie Yang's start of care visit.      Zuleika Velarde RN, Gerontological Nursing-BC, CHPN

## 2024-04-10 ENCOUNTER — TELEPHONE (OUTPATIENT)
Facility: CLINIC | Age: 34
End: 2024-04-10

## 2024-04-10 NOTE — TELEPHONE ENCOUNTER
HAYDENW called and spoke with pt's mother, Maylin, to schedule initial in-home appointment for completion of [facility] intake paperwork and social work assessment. Appointment scheduled for Monday 5/20/24 @ 10am. She said both she and her  will be home at that time.     MAEVE Gunn, HAYDENW  Licensed Clinical   [facility]  (O) [phone]  (C) [phone]

## 2024-04-14 DIAGNOSIS — F39 UNSPECIFIED MOOD (AFFECTIVE) DISORDER (HCC): ICD-10-CM

## 2024-04-15 RX ORDER — RISPERIDONE 3 MG/1
TABLET, ORALLY DISINTEGRATING ORAL
Qty: 150 TABLET | Refills: 0 | Status: SHIPPED | OUTPATIENT
Start: 2024-04-15

## 2024-05-16 ENCOUNTER — TELEPHONE (OUTPATIENT)
Facility: CLINIC | Age: 34
End: 2024-05-16

## 2024-05-16 NOTE — TELEPHONE ENCOUNTER
LCSW called pt's mother to confirm appt for Monday 5/20/24 @ 10am. She stated appreciation for the call, as she has her own follow up appointment with her doctor at 10:30am that morning. LCSW offered to reschedule to later in the day. LCSW to visit at 1:30pm on Monday 5/20/24.     MAEVE Gunn, HAYDENW  Licensed Clinical   [facility]  (O) [phone]  (C) [phone]     risk factors

## 2024-05-17 ENCOUNTER — TELEPHONE (OUTPATIENT)
Facility: CLINIC | Age: 34
End: 2024-05-17

## 2024-05-17 NOTE — TELEPHONE ENCOUNTER
Called patient to confirm their in home visit with Gracie Yang on 05/24/2024, arrival between 9-1.  Spoke with Maylin Whitehead, they confirmed the appointment and answered the covid screen questions. Does anyone in the household have covid No  Have there been any changes to insurance No or location No

## 2024-05-20 ENCOUNTER — OFFICE VISIT (OUTPATIENT)
Facility: CLINIC | Age: 34
End: 2024-05-20

## 2024-05-20 DIAGNOSIS — F39 UNSPECIFIED MOOD (AFFECTIVE) DISORDER (HCC): ICD-10-CM

## 2024-05-20 DIAGNOSIS — Z76.89 ENCOUNTER FOR SOCIAL WORK INTERVENTION: Primary | ICD-10-CM

## 2024-05-20 RX ORDER — RISPERIDONE 3 MG/1
TABLET, ORALLY DISINTEGRATING ORAL
Qty: 150 TABLET | Refills: 0 | Status: SHIPPED | OUTPATIENT
Start: 2024-05-20

## 2024-05-20 SDOH — ECONOMIC STABILITY: HOUSING INSECURITY: IN THE LAST 12 MONTHS, HOW MANY PLACES HAVE YOU LIVED?: 1

## 2024-05-20 SDOH — SOCIAL STABILITY: SOCIAL NETWORK: IN A TYPICAL WEEK, HOW MANY TIMES DO YOU TALK ON THE PHONE WITH FAMILY, FRIENDS, OR NEIGHBORS?: NEVER

## 2024-05-20 SDOH — ECONOMIC STABILITY: FOOD INSECURITY: WITHIN THE PAST 12 MONTHS, THE FOOD YOU BOUGHT JUST DIDN'T LAST AND YOU DIDN'T HAVE MONEY TO GET MORE.: NEVER TRUE

## 2024-05-20 SDOH — SOCIAL STABILITY: SOCIAL NETWORK: HOW OFTEN DO YOU ATTEND CHURCH OR RELIGIOUS SERVICES?: NEVER

## 2024-05-20 SDOH — HEALTH STABILITY: MENTAL HEALTH: HOW OFTEN DO YOU HAVE A DRINK CONTAINING ALCOHOL?: NEVER

## 2024-05-20 SDOH — SOCIAL STABILITY: SOCIAL NETWORK
DO YOU BELONG TO ANY CLUBS OR ORGANIZATIONS SUCH AS CHURCH GROUPS UNIONS, FRATERNAL OR ATHLETIC GROUPS, OR SCHOOL GROUPS?: NO

## 2024-05-20 SDOH — HEALTH STABILITY: MENTAL HEALTH: HOW MANY STANDARD DRINKS CONTAINING ALCOHOL DO YOU HAVE ON A TYPICAL DAY?: PATIENT DOES NOT DRINK

## 2024-05-20 SDOH — ECONOMIC STABILITY: FOOD INSECURITY: WITHIN THE PAST 12 MONTHS, YOU WORRIED THAT YOUR FOOD WOULD RUN OUT BEFORE YOU GOT MONEY TO BUY MORE.: NEVER TRUE

## 2024-05-20 SDOH — SOCIAL STABILITY: SOCIAL NETWORK: HOW OFTEN DO YOU ATTENT MEETINGS OF THE CLUB OR ORGANIZATION YOU BELONG TO?: NEVER

## 2024-05-20 SDOH — ECONOMIC STABILITY: INCOME INSECURITY: IN THE LAST 12 MONTHS, WAS THERE A TIME WHEN YOU WERE NOT ABLE TO PAY THE MORTGAGE OR RENT ON TIME?: NO

## 2024-05-20 SDOH — SOCIAL STABILITY: SOCIAL NETWORK: ARE YOU MARRIED, WIDOWED, DIVORCED, SEPARATED, NEVER MARRIED, OR LIVING WITH A PARTNER?: NEVER MARRIED

## 2024-05-20 SDOH — ECONOMIC STABILITY: INCOME INSECURITY: HOW HARD IS IT FOR YOU TO PAY FOR THE VERY BASICS LIKE FOOD, HOUSING, MEDICAL CARE, AND HEATING?: NOT HARD AT ALL

## 2024-05-20 SDOH — HEALTH STABILITY: PHYSICAL HEALTH: ON AVERAGE, HOW MANY DAYS PER WEEK DO YOU ENGAGE IN MODERATE TO STRENUOUS EXERCISE (LIKE A BRISK WALK)?: 0 DAYS

## 2024-05-20 SDOH — ECONOMIC STABILITY: TRANSPORTATION INSECURITY
IN THE PAST 12 MONTHS, HAS THE LACK OF TRANSPORTATION KEPT YOU FROM MEDICAL APPOINTMENTS OR FROM GETTING MEDICATIONS?: NO

## 2024-05-20 SDOH — HEALTH STABILITY: MENTAL HEALTH
STRESS IS WHEN SOMEONE FEELS TENSE, NERVOUS, ANXIOUS, OR CAN'T SLEEP AT NIGHT BECAUSE THEIR MIND IS TROUBLED. HOW STRESSED ARE YOU?: PATIENT UNABLE TO ANSWER

## 2024-05-20 SDOH — HEALTH STABILITY: PHYSICAL HEALTH: ON AVERAGE, HOW MANY MINUTES DO YOU ENGAGE IN EXERCISE AT THIS LEVEL?: 0 MIN

## 2024-05-20 NOTE — PROGRESS NOTES
have you seen a ? Psychiatrist? If yes, they be contacted by [facility] team? Pt has a Care Coordinator through [employer] that mother has been in contact with    Substance Use:   Tobacco? [] Yes [x] No    Alcohol? [] Yes, How many drinks per week: [x] No   Drugs? [] Yes, which drugs:   [x] No    Do you have an Emergency Call Device system?  [] Yes, Which brand?  [x] No, Are you interested in obtaining and subscribing to an Emergency Call Device system? Not at this time    COGNITIVE/EMOTIONAL   Mental Status: Pt was awake, unable to engage in conversation due to being nonverbal, cognitive limitations    How is your memory? Unable to assess    In the last 6 months, has anyone told you that you are forgetful? Unable to assess    Do you receive help with ADLs?  [x] Yes, Who helps you? How many hours/days? Pt's father is Medicaid attendant, and sister is back-up attendant. Mother is almost always at home as well, they work together as a family to ensure that pt's needs are being met  [] No, Do you need help?    TRANSPORTATION NEEDS ASSESSMENT  Can you use public transportation? [] Yes [x] No  Do you use transportation services provided by: Managed Care Organization? Community Service Resource? Yes, but pt is usually taken by personal vehicle to necessary appointments    EMERGENCY ACTION PLAN  GB reviewed the Emergency Action Plan with pt and/or family during this initial in-home Social Work assessment. SW completed Emergency Numbers, reviewed the content areas of Power Loss, Natural Disasters, Clinical Emergencies, Severe Weather, Safety in the Home, and Infection Control. Patient's acuity value consider to be MODERATE.    ADVANCED CARE PLANNING  [] Patient has completed AMD, scanned in to medical record  [] Patient does not have a completed AMD, LCSW initiated conversation and provided blank copy of [place] Advance Directive for Healthcare  [] Patient does not have a completed AMD, LCSW did not initiate

## 2024-05-20 NOTE — TELEPHONE ENCOUNTER
Last office visit 3/4/2024 with Yulia  Next office visit 9/10/2024 with Yulia  Last med refill 4/15/2024

## 2024-05-23 DIAGNOSIS — Z11.4 SCREENING FOR HIV (HUMAN IMMUNODEFICIENCY VIRUS): ICD-10-CM

## 2024-05-23 DIAGNOSIS — R56.9 SEIZURES (HCC): ICD-10-CM

## 2024-05-23 DIAGNOSIS — Z13.29 THYROID DISORDER SCREEN: ICD-10-CM

## 2024-05-23 DIAGNOSIS — Z51.81 ENCOUNTER FOR THERAPEUTIC DRUG LEVEL MONITORING: ICD-10-CM

## 2024-05-23 DIAGNOSIS — Z13.220 SCREENING FOR LIPID DISORDERS: ICD-10-CM

## 2024-05-23 DIAGNOSIS — G80.9 CEREBRAL PALSY, UNSPECIFIED TYPE (HCC): ICD-10-CM

## 2024-05-23 DIAGNOSIS — Z11.4 SCREENING FOR HIV (HUMAN IMMUNODEFICIENCY VIRUS): Primary | ICD-10-CM

## 2024-05-24 ENCOUNTER — OFFICE VISIT (OUTPATIENT)
Facility: CLINIC | Age: 34
End: 2024-05-24

## 2024-05-24 DIAGNOSIS — Z51.81 ENCOUNTER FOR THERAPEUTIC DRUG LEVEL MONITORING: ICD-10-CM

## 2024-05-24 DIAGNOSIS — F39 MOOD DISORDER (HCC): ICD-10-CM

## 2024-05-24 DIAGNOSIS — Z76.89 ENCOUNTER TO ESTABLISH CARE: Primary | ICD-10-CM

## 2024-05-24 DIAGNOSIS — G80.9 CEREBRAL PALSY, UNSPECIFIED TYPE (HCC): ICD-10-CM

## 2024-05-24 DIAGNOSIS — Z13.29 SCREENING FOR THYROID DISORDER: ICD-10-CM

## 2024-05-24 DIAGNOSIS — R56.9 SEIZURES (HCC): ICD-10-CM

## 2024-05-24 DIAGNOSIS — F81.9 MENTAL DEVELOPMENTAL DELAY: ICD-10-CM

## 2024-05-24 DIAGNOSIS — Z11.4 SCREENING FOR HIV WITHOUT PRESENCE OF RISK FACTORS: ICD-10-CM

## 2024-05-24 DIAGNOSIS — Z13.220 SCREENING FOR LIPID DISORDERS: ICD-10-CM

## 2024-05-24 NOTE — PROGRESS NOTES
SN joint visit with Gracie Yang NP for start of care    Patient was in bed, alert when we arrived at her home. Pt's mother present providing past medical history. No current issues reported.     Attempted to obtain vital signs - unsuccessful   Attempted to obtain blood - unsuccessful - patient would unable to stay still.     Will sent lab orders to [facility] - per Mrs. Whitehead's request.   
swallowing.    Respiratory:  Negative for choking.    Gastrointestinal:  Negative for constipation, diarrhea, nausea and vomiting.   Skin:  Negative for rash and wound.   Neurological:  Positive for seizures (No reports of recent seizures or LOC.). Negative for weakness.   Psychiatric/Behavioral:  Positive for agitation, behavioral problems and sleep disturbance.      Limited d/t patient's developmental delay and uncooperative with examination.  Physical Exam  Constitutional:       General: She is not in acute distress.     Appearance: She is not toxic-appearing.   HENT:      Head: Normocephalic.      Nose: Nose normal.   Eyes:      Conjunctiva/sclera: Conjunctivae normal.   Cardiovascular:      Rate and Rhythm: Normal rate and regular rhythm.   Pulmonary:      Effort: Pulmonary effort is normal.      Breath sounds: Normal breath sounds.   Abdominal:      General: Bowel sounds are normal.   Skin:     General: Skin is warm and dry.   Neurological:      Mental Status: She is alert.   Psychiatric:      Comments: Developmental delay, remains nonverbal.          Advanced Care Planning  Code Status:   Code Status: Full Code  Advanced Medical Directive:   Maylin Whitehead - mother  Yoel Whitehead - father      On this date 5/25/24 I have spent 38 minutes reviewing previous notes, test results and face to face with the patient discussing the diagnosis and importance of compliance with the treatment plan as well as documenting on the day of the visit.      An electronic signature was used to authenticate this note.  -- SERENA Lawton - GIO

## 2024-05-25 ASSESSMENT — ENCOUNTER SYMPTOMS
CHOKING: 0
TROUBLE SWALLOWING: 0
NAUSEA: 0
DIARRHEA: 0
VOMITING: 0
CONSTIPATION: 0

## 2024-05-25 NOTE — ASSESSMENT & PLAN NOTE
-2/2 above.  -Patient is total care and requires 24/7 supervision long-term.   -Family including mother, father, and sister is primary care givers.

## 2024-05-25 NOTE — ASSESSMENT & PLAN NOTE
-Patient has development delay, requires assistance with all ADLs, she is nonverbal and recently nonambulatory d/t progression of CP.  -Per patient's mother no new changes or new concerns, appetite is good, does dice her foods and has been tolerating this well.  -Followed by neurology with mostly does virtual visits.  -Limited examination, she makes eye contact when name is called, but withdrawals if exam is attempted, no VS were obtained.   -Continue to monitor.  -Follow up for any acute changes.

## 2024-05-25 NOTE — ASSESSMENT & PLAN NOTE
-On Depakote 125 mg BID.  -Last levels checked WNL on 1/23/22, no noted s/e or s/s of toxicity per patient's mother.   -Followed by neuro, but appears d/t difficulty with transport has not been in office for some time, telemed visit back in Sept 2023.   -Family denies any recent seizures or LOC.   -Continue current management.  -Follow up with neuro, possible for telemed.

## 2024-05-25 NOTE — ASSESSMENT & PLAN NOTE
-Behavioral issues including agitation, restlessness, and insomnia.   -Per patient's mother well managed on Risperidone 6 mg BID and 3 mg Q4H PRN.  -On Benadryl 25 mg QHS for insomnia, otherwise patient would stay up for 24 hours.   -Mood and behavioral at baseline, no acute changes.  -Continue current management.  -Continue to monitor.

## 2024-06-15 DIAGNOSIS — F39 UNSPECIFIED MOOD (AFFECTIVE) DISORDER (HCC): ICD-10-CM

## 2024-06-17 ENCOUNTER — TELEPHONE (OUTPATIENT)
Facility: CLINIC | Age: 34
End: 2024-06-17

## 2024-06-17 RX ORDER — RISPERIDONE 3 MG/1
TABLET, ORALLY DISINTEGRATING ORAL
Qty: 150 TABLET | Refills: 0 | Status: SHIPPED | OUTPATIENT
Start: 2024-06-17

## 2024-06-17 NOTE — TELEPHONE ENCOUNTER
Called patient to confirm their in home visit with Gracie Yang on 06/25/2024, arrival between 9-1.  Spoke with Katherin Whitehead, they confirmed the appointment and answered the covid screen questions. Does anyone in the household have covid No  Have there been any changes to insurance No or location No

## 2024-07-02 ASSESSMENT — ENCOUNTER SYMPTOMS
VOMITING: 0
CHOKING: 0
TROUBLE SWALLOWING: 0
DIARRHEA: 0
CONSTIPATION: 0
NAUSEA: 0

## 2024-07-02 NOTE — PROGRESS NOTES
[facility]    [phone]      NOTE: [facility] is a PROVIDER (MD/NP) based interdisciplinary practice (RN, LCSW) for patients who cannot access (or have a taxing effort) primary / speciality medical care in an office setting. [facility] is NOT Home Health Care but works with Home Health Care Agencies.when there is a skilled need. Our MD/NPs are integral in Care Transitions; PLEASE CALL  if this patient arrives in the Emergency Department or Hospital.        Date of Current Visit: 7/3/24  Patient/Family present for Current Visit: Patient, mother - Maylin  Goals of Care as stated by the patient/family is:      Preferences for hospitalization if that were to be necessary:  [] Patient DOES NOT WANT hospitalization; focus all treatments at home  [x] Patient WOULD WANT hospitalization for potentially reversible causes  Patient prefers hospitalization at: [place]    Katherin Whitehead (: 1990) is a 33 y.o. female, patient, here for evaluation of the following chief complaint(s):  Follow-up       ASSESSMENT/PLAN:  Below is the assessment and plan developed based on review of pertinent history, physical exam, labs, studies, and medications.    1. Seizures (HCC)  Assessment & Plan:  -On Depakote 125 mg BID.  -Last levels checked WNL on 22, no noted s/e or s/s of toxicity per patient's mother.   -Followed by neuro, but appears d/t difficulty with transport has not been in office for some time, telemed visit back in 2023.   -Family denies any recent seizures or LOC.   -Stable at this time.  -Encouraged to obtain labs to check levels.  -Monitor for toxicity.   -Continue current management.  -Follow up with neuro, possible for telemed.   2. Cerebral palsy, unspecified type (HCC)  Assessment & Plan:  -Patient has development delay, requires assistance with all ADLs, she is nonverbal and recently nonambulatory d/t progression of CP.  -Per

## 2024-07-03 ENCOUNTER — OFFICE VISIT (OUTPATIENT)
Facility: CLINIC | Age: 34
End: 2024-07-03

## 2024-07-03 VITALS — TEMPERATURE: 97.4 F

## 2024-07-03 DIAGNOSIS — G80.9 CEREBRAL PALSY, UNSPECIFIED TYPE (HCC): ICD-10-CM

## 2024-07-03 DIAGNOSIS — R56.9 SEIZURES (HCC): Primary | ICD-10-CM

## 2024-07-03 DIAGNOSIS — F39 MOOD DISORDER (HCC): ICD-10-CM

## 2024-07-03 DIAGNOSIS — F81.9 MENTAL DEVELOPMENTAL DELAY: ICD-10-CM

## 2024-07-03 NOTE — ASSESSMENT & PLAN NOTE
-On Depakote 125 mg BID.  -Last levels checked WNL on 1/23/22, no noted s/e or s/s of toxicity per patient's mother.   -Followed by neuro, but appears d/t difficulty with transport has not been in office for some time, telemed visit back in Sept 2023.   -Family denies any recent seizures or LOC.   -Stable at this time.  -Encouraged to obtain labs to check levels.  -Monitor for toxicity.   -Continue current management.  -Follow up with neuro, possible for telemed.

## 2024-07-03 NOTE — ASSESSMENT & PLAN NOTE
-Patient has development delay, requires assistance with all ADLs, she is nonverbal and recently nonambulatory d/t progression of CP.  -Per patient's mother no new changes or new concerns, appetite is good, requires foods to be diced and has been tolerating this well.  -Followed by neurology, but d/t difficulty with leaving home, patient mostly has virtual visits.  -Limited examination, she makes eye contact when name is called, but withdrawals if exam is attempted, no VS were obtained.   -Continue to monitor.  -Follow up for any acute changes.

## 2024-07-03 NOTE — ASSESSMENT & PLAN NOTE
-2/2 above.  -Patient is nonverbal, total care, and requires 24/7 supervision long-term.   -Family including mother, father, and sister is primary care givers.  -No acute mood/behavioral changes per family at this time.

## 2024-07-13 DIAGNOSIS — F39 UNSPECIFIED MOOD (AFFECTIVE) DISORDER (HCC): ICD-10-CM

## 2024-07-15 RX ORDER — RISPERIDONE 3 MG/1
TABLET, ORALLY DISINTEGRATING ORAL
Qty: 150 TABLET | Refills: 0 | OUTPATIENT
Start: 2024-07-15

## 2024-07-16 ENCOUNTER — CLINICAL DOCUMENTATION (OUTPATIENT)
Facility: CLINIC | Age: 34
End: 2024-07-16

## 2024-07-16 DIAGNOSIS — F39 UNSPECIFIED MOOD (AFFECTIVE) DISORDER (HCC): ICD-10-CM

## 2024-07-16 DIAGNOSIS — Z51.81 ENCOUNTER FOR THERAPEUTIC DRUG LEVEL MONITORING: ICD-10-CM

## 2024-07-16 DIAGNOSIS — R56.9 SEIZURES (HCC): ICD-10-CM

## 2024-07-16 RX ORDER — RISPERIDONE 3 MG/1
TABLET, ORALLY DISINTEGRATING ORAL
Qty: 150 TABLET | Refills: 0 | Status: SHIPPED | OUTPATIENT
Start: 2024-07-16

## 2024-07-16 RX ORDER — DIVALPROEX SODIUM 125 MG/1
CAPSULE, COATED PELLETS ORAL
Qty: 240 CAPSULE | Refills: 11 | Status: SHIPPED | OUTPATIENT
Start: 2024-07-16

## 2024-07-16 NOTE — PROGRESS NOTES
[facility] - Plan of Care  [address] [facility], [address]  [place] [address]    [facility] Team Members: Tata Toscano MD; Esmer Foster NP; Ludmila Donahue NP; Gracie Yang NP; Zuleika Velarde, ERICA; Julio C Franz, ERICA; Staci Maldonado, ERICA; Olive Torres LCSW    Katherin Whitehead  1990 / [id number]  female    Date of Initial Visit (Start of Care): 5/24/24    Diagnoses  Patient Active Problem List   Diagnosis    Mental developmental delay    Cerebral palsy (HCC)    Functional urinary incontinence    Cerebral palsy with spastic diplegia (HCC)    Seizures (HCC)    Mood disorder (HCC)    History of airway aspiration       Advance Care Planning:    Code Status: Full Code        Primary Decision Maker: Maylin Whitehead Anne Parent - [phone]    Secondary Decision Maker: Niloon,Yoel Anne Parent - [phone]       7/16/2024     9:58 AM   Demographics   Marital Status Single       DME/Supplies:  Bedside Commode, Hospital Bed, and Wheelchair (non-motorized)     No Known Allergies    Nutritional Requirements:   Oral with supported feeding    Functional/Activity Level:  Ambulatory with assistance of cane, walker and/or support of another person (significant impairment)    Safety Measures:   Aspiration risk, Oxygen use, and Self-care deficit    Acuity Level Rating: Medium    Current Outpatient Medications   Medication Sig    risperiDONE (RISPERDAL M-TABS) 3 MG disintegrating tablet DISSOLVE TWO TABLETS IN MOUTH TWICE DAILY AND ONE TABLET BY MOUTH EVERY 4 HOURS AS NEEDED FOR BREAKTHROUGH AGITATION    divalproex (DEPAKOTE SPRINKLE) 125 MG DR capsule TAKE 4 CAPSULES BY MOUTH TWICE DAILY.  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 APPLY TWICE WEEKLY FOR 2-4 WEEKS AS NEEDED     No current

## 2024-07-16 NOTE — TELEPHONE ENCOUNTER
Maylin Niloon called to request refills on the following scripts:    Divalproex Sodium 126 mgs - 1 bottle left  Respiradone - tablets, 3 mgs a lot left    Maylin said that the scripts are under the patient's old PCP so the refills were refused.  She asks for them to be submitted under Gracie Yang NP and also said that she did not ask the pharmacy to transfer the scripts.  The patient uses the [facility].  Maylin's callback is [phone]

## 2024-08-14 DIAGNOSIS — F39 UNSPECIFIED MOOD (AFFECTIVE) DISORDER (HCC): ICD-10-CM

## 2024-08-14 RX ORDER — RISPERIDONE 3 MG/1
TABLET, ORALLY DISINTEGRATING ORAL
Qty: 150 TABLET | Refills: 0 | Status: SHIPPED | OUTPATIENT
Start: 2024-08-14

## 2024-08-14 NOTE — TELEPHONE ENCOUNTER
Fax sent to office from [facility] requesting refill of Risperidone  - Take 1 tab PO BID and 1 tab PO Q4H PRN for breakthrough agitation.    Rx pended to Dr. Toscano.

## 2024-09-24 ENCOUNTER — TELEPHONE (OUTPATIENT)
Facility: CLINIC | Age: 34
End: 2024-09-24

## 2024-10-01 ENCOUNTER — OFFICE VISIT (OUTPATIENT)
Facility: CLINIC | Age: 34
End: 2024-10-01

## 2024-10-01 DIAGNOSIS — G80.1 CEREBRAL PALSY WITH SPASTIC DIPLEGIA (HCC): ICD-10-CM

## 2024-10-01 DIAGNOSIS — F81.9 MENTAL DEVELOPMENTAL DELAY: ICD-10-CM

## 2024-10-01 DIAGNOSIS — F39 MOOD DISORDER (HCC): ICD-10-CM

## 2024-10-01 DIAGNOSIS — Z00.00 PREVENTATIVE HEALTH CARE: ICD-10-CM

## 2024-10-01 DIAGNOSIS — R56.9 SEIZURES (HCC): Primary | ICD-10-CM

## 2024-10-01 ASSESSMENT — ENCOUNTER SYMPTOMS
NAUSEA: 0
TROUBLE SWALLOWING: 0
VOMITING: 0
CHOKING: 0
CONSTIPATION: 0
DIARRHEA: 0

## 2024-10-01 ASSESSMENT — PATIENT HEALTH QUESTIONNAIRE - PHQ9
DEPRESSION UNABLE TO ASSESS: FUNCTIONAL CAPACITY MOTIVATION LIMITS ACCURACY
DEPRESSION UNABLE TO ASSESS: FUNCTIONAL CAPACITY MOTIVATION LIMITS ACCURACY

## 2024-10-01 NOTE — PROGRESS NOTES
Planning  Code Status:   Code Status: Full Code  Advanced Medical Directive:   Maylin Whitehead - mother  Yoel Whitehead - father      On this date 10/1/24 I have spent  28 minutes reviewing previous notes, test results and face to face with the patient discussing the diagnosis and importance of compliance with the treatment plan as well as documenting on the day of the visit.      An electronic signature was used to authenticate this note.  -- SERENA Lawton - NP

## 2024-10-09 LAB
25(OH)D3+25(OH)D2 SERPL-MCNC: 5.4 NG/ML (ref 30–100)
ALBUMIN SERPL-MCNC: 4 G/DL (ref 3.9–4.9)
ALP SERPL-CCNC: 97 IU/L (ref 44–121)
ALT SERPL-CCNC: 11 IU/L (ref 0–32)
AST SERPL-CCNC: 17 IU/L (ref 0–40)
BILIRUB SERPL-MCNC: 0.2 MG/DL (ref 0–1.2)
BUN SERPL-MCNC: 16 MG/DL (ref 6–20)
BUN/CREAT SERPL: 26 (ref 9–23)
CALCIUM SERPL-MCNC: 9.3 MG/DL (ref 8.7–10.2)
CHLORIDE SERPL-SCNC: 109 MMOL/L (ref 96–106)
CHOLEST SERPL-MCNC: 145 MG/DL (ref 100–199)
CO2 SERPL-SCNC: 19 MMOL/L (ref 20–29)
CREAT SERPL-MCNC: 0.62 MG/DL (ref 0.57–1)
EGFRCR SERPLBLD CKD-EPI 2021: 122 ML/MIN/1.73
ERYTHROCYTE [DISTWIDTH] IN BLOOD BY AUTOMATED COUNT: 16.2 % (ref 11.7–15.4)
GLOBULIN SER CALC-MCNC: 2.8 G/DL (ref 1.5–4.5)
GLUCOSE SERPL-MCNC: 78 MG/DL (ref 70–99)
HBV SURFACE AG SERPL QL IA: NEGATIVE
HCT VFR BLD AUTO: 40 % (ref 34–46.6)
HDLC SERPL-MCNC: 47 MG/DL
HGB BLD-MCNC: 12.2 G/DL (ref 11.1–15.9)
HIV 1+2 AB+HIV1 P24 AG SERPL QL IA: NON REACTIVE
LDLC SERPL CALC-MCNC: 87 MG/DL (ref 0–99)
MCH RBC QN AUTO: 28.8 PG (ref 26.6–33)
MCHC RBC AUTO-ENTMCNC: 30.5 G/DL (ref 31.5–35.7)
MCV RBC AUTO: 95 FL (ref 79–97)
PLATELET # BLD AUTO: 296 X10E3/UL (ref 150–450)
POTASSIUM SERPL-SCNC: 3.5 MMOL/L (ref 3.5–5.2)
PROT SERPL-MCNC: 6.8 G/DL (ref 6–8.5)
RBC # BLD AUTO: 4.23 X10E6/UL (ref 3.77–5.28)
SODIUM SERPL-SCNC: 142 MMOL/L (ref 134–144)
TRIGL SERPL-MCNC: 48 MG/DL (ref 0–149)
TSH SERPL DL<=0.005 MIU/L-ACNC: 1.66 UIU/ML (ref 0.45–4.5)
VALPROATE SERPL-MCNC: 51 UG/ML (ref 50–100)
VLDLC SERPL CALC-MCNC: 11 MG/DL (ref 5–40)
WBC # BLD AUTO: 8 X10E3/UL (ref 3.4–10.8)

## 2024-11-05 ENCOUNTER — CLINICAL DOCUMENTATION (OUTPATIENT)
Facility: CLINIC | Age: 34
End: 2024-11-05

## 2024-11-05 NOTE — PROGRESS NOTES
[facility] Plan [facility] Care  [address], [address]  [place] [address]    [facility] Team Members: Dr. Prachi MD; Tata Toscano MD; Gracie Yang NP; Zuleika Velarde, ERICA; Julio C Franz, ERICA; Staci Maldonado, RN; Olive Torres LCSW    Katherin Whitehead  1990 / [id number]  female    Date of Initial Visit (Start of Care): 5/24/24    Diagnoses  Patient Active Problem List   Diagnosis    Mental developmental delay    Cerebral palsy (HCC)    Functional urinary incontinence    Cerebral palsy with spastic diplegia (HCC)    Seizures (HCC)    Mood disorder (HCC)    History of airway aspiration       Advance Care Planning:    Code Status: Full Code        Primary Decision Maker: Maylin Whitehead Anne Parent - [phone]    Secondary Decision Maker: Yoel Whitehead Anne Parent - [phone]       11/5/2024     9:56 AM   Demographics   Marital Status Single       DME/Supplies:  Bedside Commode, Hospital Bed, and Wheelchair (non-motorized)     No Known Allergies    Nutritional Requirements:   Oral with supported feeding    Functional/Activity Level:  Ambulatory with assistance of cane, walker and/or support of another person (significant impairment)    Safety Measures:   Aspiration risk, Oxygen use, and Self-care deficit    Acuity Level Rating: Medium    Current Outpatient Medications   Medication Sig    risperiDONE (RISPERDAL M-TABS) 3 MG disintegrating tablet DISSOLVE TWO TABLETS IN MOUTH TWICE DAILY AND ONE TABLET BY MOUTH EVERY 4 HOURS AS NEEDED FOR BREAKTHROUGH AGITATION    divalproex (DEPAKOTE SPRINKLE) 125 MG DR capsule TAKE 4 CAPSULES BY MOUTH TWICE DAILY.  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 APPLY TWICE WEEKLY FOR 2-4 WEEKS AS NEEDED     No current facility-administered

## 2024-11-15 ENCOUNTER — TELEPHONE (OUTPATIENT)
Facility: CLINIC | Age: 34
End: 2024-11-15

## 2024-11-15 NOTE — TELEPHONE ENCOUNTER
Maylin Whitehead (#[phone] )  is the patient's parent.  She reports that the patient's insurance is changing, effective 1/1/2025 to a new plan: [employer] [facility] D-SNP Plan.  Maylin states that this is a dual plan for both Medicare and Medicaid.  The patient is a QMB patient.  I also told Maylin that per Zuleika Velarde RN, our records show that as of 5/2024, [facility] is in network with this plan.  I asked Maylin to call the insurance company to verify that Gracie Yang NP is in network with them and to ask for a call reference number.  Maylin acknowledged.  Maylin will contact [facility] when she receives the patient new subscriber ID and new cards.

## 2024-11-25 ENCOUNTER — IMMUNIZATION (OUTPATIENT)
Age: 34
End: 2024-11-25

## 2025-01-22 ENCOUNTER — TELEPHONE (OUTPATIENT)
Facility: CLINIC | Age: 35
End: 2025-01-22

## 2025-01-22 NOTE — TELEPHONE ENCOUNTER
Called patient to confirm their in home visit with Gracie Yang on 01/28/2025, arrival between 9-1.  Spoke with Maylin Whitehead, they confirmed the appointment and answered the covid screen questions. Does anyone in the household have covid No Have there been any changes to insurance No or location No

## 2025-01-27 ASSESSMENT — ENCOUNTER SYMPTOMS
DIARRHEA: 0
VOMITING: 0
CONSTIPATION: 0
TROUBLE SWALLOWING: 0
CHOKING: 0
NAUSEA: 0

## 2025-01-27 NOTE — PROGRESS NOTES
[facility]    [phone]      NOTE: [facility] is a PROVIDER (MD/NP) based interdisciplinary practice (RN, LCSW) for patients who cannot access (or have a taxing effort) primary / speciality medical care in an office setting. [facility] is NOT Home Health Care but works with Home Health Care Agencies.when there is a skilled need. Our MD/NPs are integral in Care Transitions; PLEASE CALL  if this patient arrives in the Emergency Department or Hospital.        Date of Current Visit: 25  Patient/Family present for Current Visit: Patient, mother - Maylin  Goals of Care as stated by the patient/family is: health maintenance      Preferences for hospitalization if that were to be necessary:  [] Patient DOES NOT WANT hospitalization; focus all treatments at home  [x] Patient WOULD WANT hospitalization for potentially reversible causes  Patient prefers hospitalization at: [place]    Katherin Whitehead (: 1990) is a 34 y.o. female, patient, here for evaluation of the following chief complaint(s):  Follow-up       ASSESSMENT/PLAN:  Below is the assessment and plan developed based on review of pertinent history, physical exam, labs, studies, and medications.    1. Cerebral palsy, unspecified type (HCC)  2. Other dysphagia  -     [facility]  3. Mental developmental delay  4. Mood disorder (HCC)  5. Seizures (HCC)  6. Vitamin D deficiency    1. Cerebral palsy  -Patient has development delay, requires assistance with all ADLs, she is nonverbal and recently nonambulatory d/t progression of CP.  -Per patient's mother no new changes or new concerns.  -Has some swallowing difficulties, appetite is good, requires food to be diced - reports recent episode of choking.  -Followed by neurology, but d/t difficulty with leaving home - last visit virtual.   -Limited examination, she makes eye contact when name is called, but

## 2025-01-28 ENCOUNTER — TELEPHONE (OUTPATIENT)
Facility: CLINIC | Age: 35
End: 2025-01-28

## 2025-01-28 ENCOUNTER — OFFICE VISIT (OUTPATIENT)
Facility: CLINIC | Age: 35
End: 2025-01-28
Payer: MEDICARE

## 2025-01-28 DIAGNOSIS — F39 MOOD DISORDER (HCC): ICD-10-CM

## 2025-01-28 DIAGNOSIS — R13.19 OTHER DYSPHAGIA: ICD-10-CM

## 2025-01-28 DIAGNOSIS — R56.9 SEIZURES (HCC): ICD-10-CM

## 2025-01-28 DIAGNOSIS — G80.9 CEREBRAL PALSY, UNSPECIFIED TYPE (HCC): Primary | ICD-10-CM

## 2025-01-28 DIAGNOSIS — F81.9 MENTAL DEVELOPMENTAL DELAY: ICD-10-CM

## 2025-01-28 DIAGNOSIS — E55.9 VITAMIN D DEFICIENCY: ICD-10-CM

## 2025-01-28 PROCEDURE — 1036F TOBACCO NON-USER: CPT | Performed by: NURSE PRACTITIONER

## 2025-01-28 PROCEDURE — 99348 HOME/RES VST EST LOW MDM 30: CPT | Performed by: NURSE PRACTITIONER

## 2025-01-28 PROCEDURE — G8420 CALC BMI NORM PARAMETERS: HCPCS | Performed by: NURSE PRACTITIONER

## 2025-01-28 NOTE — TELEPHONE ENCOUNTER
Maylin Failson called to request a letter to be written on behalf of the patient- [employer] Waiver Letter, to have services renewed for the patient.  She states it is part of a Medicare program.  Maylin said that the letter needs to be on [employer] letterhead and can be emailed back to her when complete.  [email or web address].  She needs the letter before 2/1/25 and apologizes for the short notice.  CB# if needed is [phone]

## 2025-04-22 ENCOUNTER — TELEMEDICINE (OUTPATIENT)
Age: 35
End: 2025-04-22
Payer: COMMERCIAL

## 2025-04-22 DIAGNOSIS — G80.1 CEREBRAL PALSY WITH SPASTIC DIPLEGIA (HCC): ICD-10-CM

## 2025-04-22 DIAGNOSIS — E55.9 VITAMIN D DEFICIENCY: ICD-10-CM

## 2025-04-22 DIAGNOSIS — R56.9 SEIZURES (HCC): Primary | ICD-10-CM

## 2025-04-22 PROCEDURE — 99214 OFFICE O/P EST MOD 30 MIN: CPT | Performed by: PSYCHIATRY & NEUROLOGY

## 2025-04-22 NOTE — ASSESSMENT & PLAN NOTE
Botox was not effective for the patient.   Greater than a year ago she was ambulatory  Rehab services had been previously prescribed she is getting out of bed on a regular basis she does not have any ambulatory ability but has good truncal stability and upper extremity use there is been no worsening of functional level since last office visit

## 2025-04-22 NOTE — PATIENT INSTRUCTIONS
As per discussion  Overall from a neurologic perspective things are stable    In the blood work her vitamin D was down to 5.4.  I like to see that greater than 40 and actually closer to 60.  Check with primary care about what they want to do to manage that.  But in the meantime you can certainly buy the over-the-counter gummy vitamin D I generally avoid the tablets because that can cause stomach upset it varies from pharmacy to pharmacy what IU level they have but generally take about 10,000 units/day.  And you can continue this even if primary care prescribes prescription strength vitamin D because vitamin D tends to move slowly.  Again target range would be greater than 40 preferably around 60.    Her last Depakote level which was done in October was 51 and that is perfectly fine we do not need to make any changes.  And next time primary care draws her blood work they can also do another Depakote level for us that only needs to be done about once a year    Will set you up for another virtual visit in December.  And I am glad you are getting home primary care so that it is easier for you regarding transportation issues and we also have someone checking blood pressure listen to your heart and lungs and doing a hands-on assessment from a medical perspective routinely                As a reminder:   Please come to your appointment 15 minutes before your office appointment.  This way, you can get checked in at the  and checked in by the nursing staff so you have the full allotment of time with your provider for your visit.  Please bring an up-to-date and accurate list of all your medications.  Or bring all your active prescription bottles with you at the time of your office visit and this includes over-the-counter medications so we can make sure that your medication list is up-to-date.  If you are scheduled for a virtual visit, please be aware that the  will need to check you in and usually the

## 2025-04-22 NOTE — ASSESSMENT & PLAN NOTE
Most recent level is 5.4 target range 2 would be to be greater than 40 but closer to 60.  Discussed with the patient's mother all the benefits of a good strong therapeutic vitamin D level    I have recommended that she take 10,000 units of over-the-counter vitamin D Gummies [tablets tend to cause stomach upset] and also follow-up with primary care to see if they want to add to that prescription strength vitamin D.

## 2025-04-22 NOTE — PROGRESS NOTES
Identified pt with two pt identifiers(name and ). Reviewed record in preparation for visit and have obtained necessary documentation. All patient medications has been reviewed.  Chief Complaint   Patient presents with    Follow-up    Seizures             Wt Readings from Last 3 Encounters:   21 56.7 kg (125 lb)     Temp Readings from Last 3 Encounters:   24 97.4 °F (36.3 °C)   24 98.4 °F (36.9 °C) (Temporal)     BP Readings from Last 3 Encounters:   21 125/84     Pulse Readings from Last 3 Encounters:   21 111       \"Have you been to the ER, urgent care clinic since your last visit?  Hospitalized since your last visit?\"   NO    \"Have you seen or consulted any other health care providers outside of [facility] since your last visit?\"   NO      
AEDs  Depakote 125 mg 4 capsules twice daily     Prior AEDs  Dilantin  Topamax  Phenobarb  Lamotrigine  Tegretol     Seizure events/last known seizure:  March 2018     Pt remains well controlled on current medications.  There are no side effects reported     There is been no change in behavior     There is no evidence of depression related to AEDs     Patient is adherent to medication protocol     There are no issues related to malabsorption     There is no significant alcohol consumption     Driving:  Patient does not drive           CP with diplegia with mood disorder  Functionally worse related to ambulatory ability started about a year ago  Receiving Botox with some improvement but not back to baseline ambulatory issues  Was evaluated by orthopedics no significant pathology identified  Patient is predominantly bedbound per mother's report she is able to get up and walk short distances with assistance  OV 3/4/24:  was getting Botox without difference so stopped doing Botox [was being done by Dr Love]; national seating mobility for wheelchair  OV 4/22/2025: Has gotten rehab services she is getting out of bed regularly; patient is nonambulatory but has good truncal stability good use of her arms        History of aspiration  Swallowing studies as noted below  No repeat issue since January 2022  Remains stable today without any repeat issues              Pertinent diagnostic data     EXAM: XR SWALLOW FUNC VIDEO included 1/24/2022  INDICATION: History of food bolus aspiration.  Fluoroscopy dose (air kerma): 4.71 mGy.     FINDINGS: Fluoroscopic assistance and image interpretation services were  provided to speech therapy for performance of a modified barium swallow. The  patient ingested liquid barium, barium mixed with applesauce and barium coated  crackers in the lateral position with continuous fluoroscopy. No evidence of  penetration or aspiration. Minimal residual vallecular residue.     IMPRESSION  Please refer

## 2025-04-22 NOTE — ASSESSMENT & PLAN NOTE
Patient remains on Depakote 125 mg 4 caps twice a day for total of 8 caps per 24 hours.    Most recent level taken by primary care [much appreciated] was 51.  Patient has been stable for many many years on current AED prescription I recommend no changes at this time.    Recommend she get her Depakote level drawn once a year by primary care but as always we will treat the patient not the level that as long as she is stable we would not recommend any changes we just need annual levels to understand her baseline

## 2025-05-21 ENCOUNTER — TELEPHONE (OUTPATIENT)
Facility: CLINIC | Age: 35
End: 2025-05-21

## 2025-05-21 NOTE — TELEPHONE ENCOUNTER
Called patient to confirm their in home visit with Gracie Yang on 05/27/2025, arrival between 9-1.  Spoke with Maylin, they confirmed the appointment and answered the covid screen questions. Does anyone in the household have covid no  Have there been any changes to insurance no or location no

## 2025-05-27 ENCOUNTER — OFFICE VISIT (OUTPATIENT)
Facility: CLINIC | Age: 35
End: 2025-05-27
Payer: COMMERCIAL

## 2025-05-27 ENCOUNTER — TELEPHONE (OUTPATIENT)
Facility: CLINIC | Age: 35
End: 2025-05-27

## 2025-05-27 VITALS — RESPIRATION RATE: 22 BRPM | TEMPERATURE: 97.9 F

## 2025-05-27 DIAGNOSIS — R13.19 OTHER DYSPHAGIA: ICD-10-CM

## 2025-05-27 DIAGNOSIS — F39 MOOD DISORDER: ICD-10-CM

## 2025-05-27 DIAGNOSIS — E55.9 VITAMIN D DEFICIENCY: ICD-10-CM

## 2025-05-27 DIAGNOSIS — G80.9 CEREBRAL PALSY, UNSPECIFIED TYPE (HCC): Primary | ICD-10-CM

## 2025-05-27 DIAGNOSIS — Z00.00 ENCOUNTER FOR ANNUAL WELLNESS VISIT (AWV) IN MEDICARE PATIENT: ICD-10-CM

## 2025-05-27 DIAGNOSIS — R56.9 SEIZURES (HCC): ICD-10-CM

## 2025-05-27 DIAGNOSIS — F81.9 MENTAL DEVELOPMENTAL DELAY: ICD-10-CM

## 2025-05-27 PROCEDURE — G0439 PPPS, SUBSEQ VISIT: HCPCS | Performed by: NURSE PRACTITIONER

## 2025-05-27 PROCEDURE — 99348 HOME/RES VST EST LOW MDM 30: CPT | Performed by: NURSE PRACTITIONER

## 2025-05-27 ASSESSMENT — ENCOUNTER SYMPTOMS
DIARRHEA: 0
TROUBLE SWALLOWING: 0
CONSTIPATION: 0
CHOKING: 0
NAUSEA: 0
VOMITING: 0

## 2025-05-27 NOTE — TELEPHONE ENCOUNTER
Maylin Failson called to report that when she did precheck for the patient, [employer] asked for a copay.  We reviewed the patient insurance and all is correct.  Maylin confirms that Medicare is secondary.  I let Maylin know that the epic system does not show that the patient owes a co-pay and explained that the message she saw is an optional proposed auto pay of $100.  Maylin expressed understanding.  Her callback if needed is [phone].

## 2025-05-27 NOTE — PROGRESS NOTES
This is the Subsequent Medicare Annual Wellness Exam, performed 12 months or more after the Initial AWV or the last Subsequent AWV    I have reviewed the patient's medical history in detail and updated the computerized patient record.       Assessment/Plan   Education and counseling provided:  Are appropriate based on today's review and evaluation    1. Cerebral palsy, unspecified type (HCC)  2. Other dysphagia  3. Mental developmental delay  4. Seizures (HCC)  5. Mood disorder  6. Vitamin D deficiency  7. Encounter for annual wellness visit (AWV) in Medicare patient       Depression Risk Factor Screening         10/1/2024    12:52 PM   PHQ-9    Depression Unable to Assess Functional capacity motivation limits accuracy         Alcohol & Drug Abuse Risk Screen     Do you average more than 1 drink per night or more than 7 drinks a week:  no    On any one occasion in the past three months have you have had more than 3 drinks containing alcohol: no    Functional Ability and Level of Safety   Hearing: hearing is good     Activities of Daily Living:  The home contains: handrails and grab bars  The patient needs help with : transportation, shopping, preparing meals, housework, managing medication, eating, laundry, dressing, bathing , laundry, bathroom needs, hygiene, and phone     Ambulation: Patient ambulates bed bound    Failed to redirect to the Timeline version of the RE[facility] SmartLink.        Abuse Screen: The patient is not abused.     Cognitive Screening    Has your family/caregiver stated any concerns about your memory:  patient nonverbal with developmental delay, unable to participate in any cognitive screening tests.    Cognitive screening: unable to perform.    Health Maintenance Due     Health Maintenance Due   Topic Date Due    Varicella vaccine (1 of 2 - 13+ 2-dose series) Never done    Hepatitis C screen  Never done    Hepatitis B vaccine (1 of 3 - 19+ 3-dose series) Never done    DTaP/Tdap/Td vaccine (1 -

## 2025-08-12 ENCOUNTER — TELEPHONE (OUTPATIENT)
Facility: CLINIC | Age: 35
End: 2025-08-12

## 2025-08-12 SDOH — SOCIAL STABILITY: SOCIAL INSECURITY: WITHIN THE LAST YEAR, HAVE YOU BEEN HUMILIATED OR EMOTIONALLY ABUSED IN OTHER WAYS BY YOUR PARTNER OR EX-PARTNER?: NO

## 2025-08-12 SDOH — SOCIAL STABILITY: SOCIAL NETWORK: HOW OFTEN DO YOU GET TOGETHER WITH FRIENDS OR RELATIVES?: MORE THAN THREE TIMES A WEEK

## 2025-08-12 SDOH — HEALTH STABILITY: PHYSICAL HEALTH: ON AVERAGE, HOW MANY MINUTES DO YOU ENGAGE IN EXERCISE AT THIS LEVEL?: 0 MIN

## 2025-08-12 SDOH — HEALTH STABILITY: MENTAL HEALTH
DO YOU FEEL STRESS - TENSE, RESTLESS, NERVOUS, OR ANXIOUS, OR UNABLE TO SLEEP AT NIGHT BECAUSE YOUR MIND IS TROUBLED ALL THE TIME - THESE DAYS?: NOT AT ALL

## 2025-08-12 SDOH — SOCIAL STABILITY: SOCIAL INSECURITY
WITHIN THE LAST YEAR, HAVE YOU BEEN KICKED, HIT, SLAPPED, OR OTHERWISE PHYSICALLY HURT BY YOUR PARTNER OR EX-PARTNER?: NO

## 2025-08-12 SDOH — SOCIAL STABILITY: SOCIAL NETWORK
DO YOU BELONG TO ANY CLUBS OR ORGANIZATIONS SUCH AS CHURCH GROUPS, UNIONS, FRATERNAL OR ATHLETIC GROUPS, OR SCHOOL GROUPS?: NO

## 2025-08-12 SDOH — SOCIAL STABILITY: SOCIAL NETWORK: HOW OFTEN DO YOU ATTEND MEETINGS OF THE CLUBS OR ORGANIZATIONS YOU BELONG TO?: NEVER

## 2025-08-12 SDOH — ECONOMIC STABILITY: FOOD INSECURITY: HOW HARD IS IT FOR YOU TO PAY FOR THE VERY BASICS LIKE FOOD, HOUSING, MEDICAL CARE, AND HEATING?: NOT HARD AT ALL

## 2025-08-12 SDOH — SOCIAL STABILITY: SOCIAL INSECURITY: ARE YOU MARRIED, WIDOWED, DIVORCED, SEPARATED, NEVER MARRIED, OR LIVING WITH A PARTNER?: NEVER MARRIED

## 2025-08-12 SDOH — ECONOMIC STABILITY: FOOD INSECURITY: WITHIN THE PAST 12 MONTHS, YOU WORRIED THAT YOUR FOOD WOULD RUN OUT BEFORE YOU GOT THE MONEY TO BUY MORE.: NEVER TRUE

## 2025-08-12 SDOH — SOCIAL STABILITY: SOCIAL INSECURITY
WITHIN THE LAST YEAR, HAVE YOU BEEN RAPED OR FORCED TO HAVE ANY KIND OF SEXUAL ACTIVITY BY YOUR PARTNER OR EX-PARTNER?: NO

## 2025-08-12 SDOH — HEALTH STABILITY: MENTAL HEALTH: HOW OFTEN DO YOU HAVE A DRINK CONTAINING ALCOHOL?: NEVER

## 2025-08-12 SDOH — ECONOMIC STABILITY: FOOD INSECURITY: WITHIN THE PAST 12 MONTHS, THE FOOD YOU BOUGHT JUST DIDN'T LAST AND YOU DIDN'T HAVE MONEY TO GET MORE.: NEVER TRUE

## 2025-08-12 SDOH — ECONOMIC STABILITY: TRANSPORTATION INSECURITY: IN THE PAST 12 MONTHS, HAS LACK OF TRANSPORTATION KEPT YOU FROM MEDICAL APPOINTMENTS OR FROM GETTING MEDICATIONS?: NO

## 2025-08-12 SDOH — SOCIAL STABILITY: SOCIAL NETWORK: HOW OFTEN DO YOU ATTEND CHURCH OR RELIGIOUS SERVICES?: NEVER

## 2025-08-12 SDOH — SOCIAL STABILITY: SOCIAL NETWORK: IN A TYPICAL WEEK, HOW MANY TIMES DO YOU TALK ON THE PHONE WITH FAMILY, FRIENDS, OR NEIGHBORS?: NEVER

## 2025-08-12 SDOH — HEALTH STABILITY: MENTAL HEALTH: HOW MANY DRINKS CONTAINING ALCOHOL DO YOU HAVE ON A TYPICAL DAY WHEN YOU ARE DRINKING?: PATIENT DOES NOT DRINK

## 2025-08-12 SDOH — SOCIAL STABILITY: SOCIAL INSECURITY: WITHIN THE LAST YEAR, HAVE YOU BEEN AFRAID OF YOUR PARTNER OR EX-PARTNER?: NO

## 2025-08-12 SDOH — HEALTH STABILITY: PHYSICAL HEALTH: ON AVERAGE, HOW MANY DAYS PER WEEK DO YOU ENGAGE IN MODERATE TO STRENUOUS EXERCISE (LIKE A BRISK WALK)?: 0 DAYS

## 2025-08-12 ASSESSMENT — ACTIVITIES OF DAILY LIVING (ADL): LACK_OF_TRANSPORTATION: NO
